# Patient Record
Sex: FEMALE | Race: WHITE | Employment: OTHER | URBAN - METROPOLITAN AREA
[De-identification: names, ages, dates, MRNs, and addresses within clinical notes are randomized per-mention and may not be internally consistent; named-entity substitution may affect disease eponyms.]

---

## 2020-02-11 ENCOUNTER — TRANSFERRED RECORDS (OUTPATIENT)
Dept: HEALTH INFORMATION MANAGEMENT | Facility: CLINIC | Age: 70
End: 2020-02-11

## 2020-02-18 ENCOUNTER — TRANSFERRED RECORDS (OUTPATIENT)
Dept: HEALTH INFORMATION MANAGEMENT | Facility: CLINIC | Age: 70
End: 2020-02-18

## 2020-03-03 ENCOUNTER — TRANSFERRED RECORDS (OUTPATIENT)
Dept: HEALTH INFORMATION MANAGEMENT | Facility: CLINIC | Age: 70
End: 2020-03-03

## 2020-03-03 ENCOUNTER — HOSPITAL ENCOUNTER (INPATIENT)
Facility: CLINIC | Age: 70
LOS: 3 days | Discharge: HOSPICE | DRG: 315 | End: 2020-03-06
Attending: EMERGENCY MEDICINE | Admitting: INTERNAL MEDICINE
Payer: COMMERCIAL

## 2020-03-03 DIAGNOSIS — C34.90 PRIMARY MALIGNANT NEOPLASM OF LUNG METASTATIC TO OTHER SITE, UNSPECIFIED LATERALITY (H): ICD-10-CM

## 2020-03-03 DIAGNOSIS — R55 NEAR SYNCOPE: ICD-10-CM

## 2020-03-03 DIAGNOSIS — B37.0 THRUSH: Primary | ICD-10-CM

## 2020-03-03 PROBLEM — N17.9 AKI (ACUTE KIDNEY INJURY) (H): Status: ACTIVE | Noted: 2020-03-03

## 2020-03-03 LAB
ALBUMIN SERPL-MCNC: 2.6 G/DL (ref 3.4–5)
ALP SERPL-CCNC: 49 U/L (ref 40–150)
ALT SERPL W P-5'-P-CCNC: 29 U/L (ref 0–50)
ANION GAP SERPL CALCULATED.3IONS-SCNC: 10 MMOL/L (ref 3–14)
ANISOCYTOSIS BLD QL SMEAR: SLIGHT
AST SERPL W P-5'-P-CCNC: 40 U/L (ref 0–45)
BASOPHILS # BLD AUTO: 0 10E9/L (ref 0–0.2)
BASOPHILS NFR BLD AUTO: 0 %
BILIRUB SERPL-MCNC: 1 MG/DL (ref 0.2–1.3)
BUN SERPL-MCNC: 60 MG/DL (ref 7–30)
CALCIUM SERPL-MCNC: 9.2 MG/DL (ref 8.5–10.1)
CHLORIDE SERPL-SCNC: 94 MMOL/L (ref 94–109)
CO2 SERPL-SCNC: 24 MMOL/L (ref 20–32)
CREAT SERPL-MCNC: 2.44 MG/DL (ref 0.52–1.04)
DIFFERENTIAL METHOD BLD: ABNORMAL
EOSINOPHIL # BLD AUTO: 0 10E9/L (ref 0–0.7)
EOSINOPHIL NFR BLD AUTO: 0 %
ERYTHROCYTE [DISTWIDTH] IN BLOOD BY AUTOMATED COUNT: 17.6 % (ref 10–15)
GFR SERPL CREATININE-BSD FRML MDRD: 20 ML/MIN/{1.73_M2}
GLUCOSE BLDC GLUCOMTR-MCNC: 136 MG/DL (ref 70–99)
GLUCOSE SERPL-MCNC: 135 MG/DL (ref 70–99)
HCT VFR BLD AUTO: 26.3 % (ref 35–47)
HGB BLD-MCNC: 8.8 G/DL (ref 11.7–15.7)
LACTATE BLD-SCNC: 1.4 MMOL/L (ref 0.7–2)
LYMPHOCYTES # BLD AUTO: 0.1 10E9/L (ref 0.8–5.3)
LYMPHOCYTES NFR BLD AUTO: 4 %
MACROCYTES BLD QL SMEAR: PRESENT
MCH RBC QN AUTO: 30.6 PG (ref 26.5–33)
MCHC RBC AUTO-ENTMCNC: 33.5 G/DL (ref 31.5–36.5)
MCV RBC AUTO: 91 FL (ref 78–100)
MONOCYTES # BLD AUTO: 0 10E9/L (ref 0–1.3)
MONOCYTES NFR BLD AUTO: 1 %
NEUTROPHILS # BLD AUTO: 2.9 10E9/L (ref 1.6–8.3)
NEUTROPHILS NFR BLD AUTO: 95 %
PLATELET # BLD AUTO: 176 10E9/L (ref 150–450)
PLATELET # BLD EST: ABNORMAL 10*3/UL
POTASSIUM SERPL-SCNC: 4.2 MMOL/L (ref 3.4–5.3)
PROT SERPL-MCNC: 6.2 G/DL (ref 6.8–8.8)
RBC # BLD AUTO: 2.88 10E12/L (ref 3.8–5.2)
SODIUM SERPL-SCNC: 128 MMOL/L (ref 133–144)
WBC # BLD AUTO: 3 10E9/L (ref 4–11)

## 2020-03-03 PROCEDURE — 99222 1ST HOSP IP/OBS MODERATE 55: CPT | Mod: AI | Performed by: INTERNAL MEDICINE

## 2020-03-03 PROCEDURE — 25000131 ZZH RX MED GY IP 250 OP 636 PS 637: Performed by: INTERNAL MEDICINE

## 2020-03-03 PROCEDURE — 83605 ASSAY OF LACTIC ACID: CPT | Performed by: EMERGENCY MEDICINE

## 2020-03-03 PROCEDURE — 25800030 ZZH RX IP 258 OP 636: Performed by: EMERGENCY MEDICINE

## 2020-03-03 PROCEDURE — 99291 CRITICAL CARE FIRST HOUR: CPT | Mod: 25

## 2020-03-03 PROCEDURE — 96361 HYDRATE IV INFUSION ADD-ON: CPT

## 2020-03-03 PROCEDURE — 25800030 ZZH RX IP 258 OP 636: Performed by: INTERNAL MEDICINE

## 2020-03-03 PROCEDURE — 12000011 ZZH R&B MS OVERFLOW

## 2020-03-03 PROCEDURE — 85025 COMPLETE CBC W/AUTO DIFF WBC: CPT | Performed by: EMERGENCY MEDICINE

## 2020-03-03 PROCEDURE — 25000132 ZZH RX MED GY IP 250 OP 250 PS 637: Performed by: INTERNAL MEDICINE

## 2020-03-03 PROCEDURE — 93005 ELECTROCARDIOGRAM TRACING: CPT

## 2020-03-03 PROCEDURE — 96360 HYDRATION IV INFUSION INIT: CPT

## 2020-03-03 PROCEDURE — 80053 COMPREHEN METABOLIC PANEL: CPT | Performed by: EMERGENCY MEDICINE

## 2020-03-03 PROCEDURE — 00000146 ZZHCL STATISTIC GLUCOSE BY METER IP

## 2020-03-03 RX ORDER — AMOXICILLIN 250 MG
2 CAPSULE ORAL 2 TIMES DAILY
Status: DISCONTINUED | OUTPATIENT
Start: 2020-03-03 | End: 2020-03-06 | Stop reason: HOSPADM

## 2020-03-03 RX ORDER — MORPHINE SULFATE 15 MG/1
15 TABLET, FILM COATED, EXTENDED RELEASE ORAL EVERY 12 HOURS
Status: DISCONTINUED | OUTPATIENT
Start: 2020-03-03 | End: 2020-03-04

## 2020-03-03 RX ORDER — HYDROMORPHONE HYDROCHLORIDE 1 MG/ML
.3-.5 INJECTION, SOLUTION INTRAMUSCULAR; INTRAVENOUS; SUBCUTANEOUS
Status: DISCONTINUED | OUTPATIENT
Start: 2020-03-03 | End: 2020-03-04

## 2020-03-03 RX ORDER — NALOXONE HYDROCHLORIDE 0.4 MG/ML
.1-.4 INJECTION, SOLUTION INTRAMUSCULAR; INTRAVENOUS; SUBCUTANEOUS
Status: DISCONTINUED | OUTPATIENT
Start: 2020-03-03 | End: 2020-03-06 | Stop reason: HOSPADM

## 2020-03-03 RX ORDER — DEXAMETHASONE 4 MG/1
4 TABLET ORAL 2 TIMES DAILY WITH MEALS
Status: DISCONTINUED | OUTPATIENT
Start: 2020-03-03 | End: 2020-03-06 | Stop reason: HOSPADM

## 2020-03-03 RX ORDER — GABAPENTIN 300 MG/1
300 CAPSULE ORAL 2 TIMES DAILY
Status: ON HOLD | COMMUNITY
End: 2020-03-05

## 2020-03-03 RX ORDER — CYCLOBENZAPRINE HCL 5 MG
5 TABLET ORAL 3 TIMES DAILY PRN
Status: ON HOLD | COMMUNITY
End: 2020-03-05

## 2020-03-03 RX ORDER — HYDROCODONE BITARTRATE AND ACETAMINOPHEN 5; 325 MG/1; MG/1
1-2 TABLET ORAL EVERY 4 HOURS PRN
Status: DISCONTINUED | OUTPATIENT
Start: 2020-03-03 | End: 2020-03-06 | Stop reason: HOSPADM

## 2020-03-03 RX ORDER — DOCUSATE SODIUM 100 MG/1
100 CAPSULE, LIQUID FILLED ORAL 2 TIMES DAILY
Status: DISCONTINUED | OUTPATIENT
Start: 2020-03-03 | End: 2020-03-03

## 2020-03-03 RX ORDER — ACETAMINOPHEN 325 MG/1
650 TABLET ORAL EVERY 4 HOURS PRN
Status: DISCONTINUED | OUTPATIENT
Start: 2020-03-03 | End: 2020-03-06 | Stop reason: HOSPADM

## 2020-03-03 RX ORDER — POLYETHYLENE GLYCOL 3350 17 G/17G
17 POWDER, FOR SOLUTION ORAL DAILY
Status: DISCONTINUED | OUTPATIENT
Start: 2020-03-04 | End: 2020-03-06 | Stop reason: HOSPADM

## 2020-03-03 RX ORDER — ALBUTEROL SULFATE 90 UG/1
2 AEROSOL, METERED RESPIRATORY (INHALATION) EVERY 6 HOURS PRN
Status: DISCONTINUED | OUTPATIENT
Start: 2020-03-03 | End: 2020-03-06 | Stop reason: HOSPADM

## 2020-03-03 RX ORDER — ONDANSETRON 4 MG/1
4 TABLET, ORALLY DISINTEGRATING ORAL EVERY 6 HOURS PRN
Status: DISCONTINUED | OUTPATIENT
Start: 2020-03-03 | End: 2020-03-06 | Stop reason: HOSPADM

## 2020-03-03 RX ORDER — OLANZAPINE 2.5 MG/1
2.5 TABLET, FILM COATED ORAL DAILY
Status: ON HOLD | COMMUNITY
End: 2020-03-05

## 2020-03-03 RX ORDER — SODIUM CHLORIDE 9 MG/ML
INJECTION, SOLUTION INTRAVENOUS CONTINUOUS
Status: DISCONTINUED | OUTPATIENT
Start: 2020-03-03 | End: 2020-03-04

## 2020-03-03 RX ORDER — LIDOCAINE 40 MG/G
CREAM TOPICAL
Status: DISCONTINUED | OUTPATIENT
Start: 2020-03-03 | End: 2020-03-06 | Stop reason: HOSPADM

## 2020-03-03 RX ORDER — GABAPENTIN 300 MG/1
300 CAPSULE ORAL 2 TIMES DAILY
Status: DISCONTINUED | OUTPATIENT
Start: 2020-03-03 | End: 2020-03-05

## 2020-03-03 RX ORDER — MORPHINE SULFATE 15 MG/1
15 TABLET, FILM COATED, EXTENDED RELEASE ORAL EVERY 12 HOURS
Status: ON HOLD | COMMUNITY
End: 2020-03-05

## 2020-03-03 RX ORDER — PROCHLORPERAZINE MALEATE 10 MG
10 TABLET ORAL EVERY 6 HOURS PRN
COMMUNITY

## 2020-03-03 RX ORDER — CYCLOBENZAPRINE HCL 5 MG
5 TABLET ORAL 3 TIMES DAILY PRN
Status: DISCONTINUED | OUTPATIENT
Start: 2020-03-03 | End: 2020-03-06 | Stop reason: HOSPADM

## 2020-03-03 RX ORDER — DEXAMETHASONE 4 MG/1
4 TABLET ORAL 2 TIMES DAILY WITH MEALS
COMMUNITY

## 2020-03-03 RX ORDER — ONDANSETRON 2 MG/ML
4 INJECTION INTRAMUSCULAR; INTRAVENOUS EVERY 6 HOURS PRN
Status: DISCONTINUED | OUTPATIENT
Start: 2020-03-03 | End: 2020-03-06 | Stop reason: HOSPADM

## 2020-03-03 RX ORDER — ALBUTEROL SULFATE 90 UG/1
2 AEROSOL, METERED RESPIRATORY (INHALATION) EVERY 6 HOURS PRN
COMMUNITY

## 2020-03-03 RX ORDER — BISACODYL 10 MG
10 SUPPOSITORY, RECTAL RECTAL DAILY PRN
Status: DISCONTINUED | OUTPATIENT
Start: 2020-03-03 | End: 2020-03-06 | Stop reason: HOSPADM

## 2020-03-03 RX ORDER — PROCHLORPERAZINE MALEATE 5 MG
10 TABLET ORAL EVERY 6 HOURS PRN
Status: DISCONTINUED | OUTPATIENT
Start: 2020-03-03 | End: 2020-03-06 | Stop reason: HOSPADM

## 2020-03-03 RX ORDER — HYDROCODONE BITARTRATE AND ACETAMINOPHEN 5; 325 MG/1; MG/1
1 TABLET ORAL EVERY 4 HOURS PRN
Status: ON HOLD | COMMUNITY
End: 2020-03-05

## 2020-03-03 RX ADMIN — FLUTICASONE FUROATE AND VILANTEROL TRIFENATATE 1 PUFF: 200; 25 POWDER RESPIRATORY (INHALATION) at 19:54

## 2020-03-03 RX ADMIN — SODIUM CHLORIDE 1000 ML: 9 INJECTION, SOLUTION INTRAVENOUS at 14:55

## 2020-03-03 RX ADMIN — SODIUM CHLORIDE 1000 ML: 9 INJECTION, SOLUTION INTRAVENOUS at 15:00

## 2020-03-03 RX ADMIN — DEXAMETHASONE 4 MG: 2 TABLET ORAL at 18:29

## 2020-03-03 RX ADMIN — SENNOSIDES AND DOCUSATE SODIUM 2 TABLET: 8.6; 5 TABLET ORAL at 19:53

## 2020-03-03 RX ADMIN — MORPHINE SULFATE 15 MG: 15 TABLET, EXTENDED RELEASE ORAL at 19:54

## 2020-03-03 RX ADMIN — SODIUM CHLORIDE: 9 INJECTION, SOLUTION INTRAVENOUS at 18:26

## 2020-03-03 RX ADMIN — HYDROCODONE BITARTRATE AND ACETAMINOPHEN 1 TABLET: 5; 325 TABLET ORAL at 18:30

## 2020-03-03 RX ADMIN — GABAPENTIN 300 MG: 300 CAPSULE ORAL at 19:53

## 2020-03-03 ASSESSMENT — ACTIVITIES OF DAILY LIVING (ADL)
SWALLOWING: 0-->SWALLOWS FOODS/LIQUIDS WITHOUT DIFFICULTY
AMBULATION: 2-->ASSISTIVE PERSON
RETIRED_COMMUNICATION: 0-->UNDERSTANDS/COMMUNICATES WITHOUT DIFFICULTY
FALL_HISTORY_WITHIN_LAST_SIX_MONTHS: YES
TOILETING: 1-->ASSISTIVE EQUIPMENT
TRANSFERRING: 2-->ASSISTIVE PERSON
COGNITION: 1 - ATTENTION OR MEMORY DEFICITS
NUMBER_OF_TIMES_PATIENT_HAS_FALLEN_WITHIN_LAST_SIX_MONTHS: 1
RETIRED_EATING: 0-->INDEPENDENT
WHICH_OF_THE_ABOVE_FUNCTIONAL_RISKS_HAD_A_RECENT_ONSET_OR_CHANGE?: AMBULATION;TRANSFERRING;TOILETING;BATHING;DRESSING;FALL HISTORY
BATHING: 2-->ASSISTIVE PERSON
DRESS: 1-->ASSISTIVE EQUIPMENT

## 2020-03-03 ASSESSMENT — ENCOUNTER SYMPTOMS
HEADACHES: 0
WEAKNESS: 1
VOMITING: 0
DIARRHEA: 0
APPETITE CHANGE: 1
CONSTIPATION: 1
COUGH: 0
FEVER: 0

## 2020-03-03 NOTE — ED PROVIDER NOTES
History     Chief Complaint:  Loss of Consciousness    The history is provided by the patient, a relative, the EMS personnel and the spouse. The history is limited by the condition of the patient.      Susan Beckham is a 69 year old female who presents with thyroid and breast cancer who presents today via EMS for evaluation after a pre-syncopal episode. Per family, patient has been declining from a functional status over the past few days.  She has not as alert.  She is generally weak.  She has not eating as much.  No specific fever or cough or vomiting or diarrhea.  She is quite constipated due to the opiates she is taking.  Per family, her oncologist is Dr. Rosenthal at [facility] and they have been told that she does not have many days left to live.  Patient's daughter is power of  who is here at bedside.  Pt daughter and fiance and patient herself describe deciding that patient is DNR, DNI recently but have not signed the paperwork.     This morning, patient's knees buckled and she nearly fell though her  was there to catch her.  They then called an ambulance to get her to her appointment this morning.      Prior to arrival, the patient was walking in to her oncology toxicity check, with her last chemotherapy last week, when she had a possible syncopal episode. EMS reports that she was unconscious for 1-2 minutes but pt's family states that she never lost conscioussness. Pt did not hit her head. While sitting in a chair, her blood pressure was found to be 60/30 and had a heart rate of 120. She slowly started to come around and is mentating at her baseline. En route to the ED, EMS found her blood pressure to be 111/81 systolic with 94-95% O2 saturations, no blood sugar was taken. Here, patient is asymptomatic.     Allergies:  Sulfa drugs    Medications:    Lorazepam  Omeprazole  Norco  Levothyroxine    Past Medical History:    HTN  Malignant neoplasm  Thyroid disease  Cancer, neuroendocrine,  poorly differentiated  Colitis  Depression   GERD  Asthma    Past Surgical History:    Gyn surgery  Mastectomy  Thyroidectomy  Head and neck surgery  DAVID BSO    Family History:    Cancer, daughter, mother, father, sister    Social History:  Smoking status: Never smoker  Alcohol use: Socially not currently  Drug use: No  PCP: Ayde Wong MD  Marital Status:       Review of Systems   Constitutional: Positive for appetite change. Negative for fever.   Respiratory: Negative for cough.    Gastrointestinal: Positive for constipation. Negative for diarrhea and vomiting.   Neurological: Positive for syncope and weakness. Negative for headaches.   All other systems reviewed and are negative.    Physical Exam     Patient Vitals for the past 24 hrs:   BP Temp Temp src Pulse Heart Rate Resp SpO2   03/03/20 1645 -- -- -- -- 113 22 --   03/03/20 1630 -- -- -- -- 114 17 --   03/03/20 1545 -- -- -- -- 108 15 --   03/03/20 1515 -- -- -- -- -- 18 98 %   03/03/20 1500 (!) 115/99 -- -- 106 -- -- --   03/03/20 1445 91/49 -- -- -- 113 -- --   03/03/20 1443 -- 98.4  F (36.9  C) Oral -- -- -- --   03/03/20 1442 (!) 89/66 -- -- -- 115 18 98 %        Physical Exam  VS: Reviewed per above  HENT: Mucous membranes dry, no nuchal rigidity  EYES: sclera anicteric  CV: Rate as noted, regular rhythm.   RESP: Effort normal. Breath sounds are normal bilaterally.  GI: no tenderness/rebound/guarding, + distended.  NEURO: GCS 14, oriented to person and place, no facial asymmetry, no drift in extremities.  MSK: No deformity of the extremities  SKIN: Warm and dry      Emergency Department Course     ECG:  ECG taken at 1440, ECG read at 1445  Sinus tachycardia  Left anterior fascicular block   Possible anterior infarct, age undetermined  Abnormal ECG  Rate 114 bpm. MI interval 158 ms. QRS duration 94 ms. QT/QTc 334/460 ms. P-R-T axes 12 -50 54.    Laboratory:  Laboratory findings were communicated with the patient who voiced understanding of the  findings.    CBC: WBC 3.0(L), HGB 8.8(L),   CMP: (L), Glucose 135(H), BUN 60 (H), GFR 20(L), Creatinine 2.44(H)  Lactic acid whole blood: 1.4  Glucose by meter: 136(H)    Interventions:  1455 NS 1L IV Bolus   1500 NS 1L IV Bolus     Emergency Department Course:  1430 Nursing notes and vitals reviewed.    I performed an exam of the patient as documented above.     1454 IV was inserted and blood was drawn for laboratory testing, results above.     I personally reviewed the results with the patient and answered all related questions prior to admission.    5060 I spoke to Dr. Bui of the hospitalist service who accepts the patient for admission.    Impression & Plan      Medical Decision Making:  Susan Beckham is a 69 year old female who presents to the emergency department today for evaluation of near syncopal episode, hypotension.  On arrival patient's blood pressure was 90 systolic, heart rate is in the low 100s.  EKG reveals a sinus tachycardia.  Initial labs reveal hyponatremia of 128, creatinine elevation of 2.4 compared to 0.9 on 12/19 in care everywhere.  After IV fluids, blood pressure improved.  I discussed at length with patient and daughter who is her POA as well as her fiancé.  They confirm DNR/DNI status.  I then discussed next steps including further evaluation of her low blood pressures/near syncopal events as well as potential etiologies such as dehydration, sepsis, blood loss anemia, PE, cardiogenic shock, worsening metastatic disease.  Patient and family discussed amongst themselves and decided that they would like to focus on comfort measures at this time and not life-prolonging interventions or testing.  Patient was provided with bear hugger she was cold and water as she was thirsty.  She declined any pain medications.  Patient was admitted to Dr. Bui for further palliative care evaluation.    Diagnosis:    ICD-10-CM    1. Primary malignant neoplasm of lung metastatic to other site,  unspecified laterality (H) C34.90 CBC with platelets differential   2. Near syncope R55        Disposition:   The patient is admitted into the care of Dr. Bui.     Scribe Disclosure:  I, Cassandra Katz, am serving as a scribe on 3/3/2020 to document services personally performed by Winston Domingo MD based on my observations and the provider's statements to me.  [facility] EMERGENCY DEPARTMENT       Winston Domingo MD  03/03/20 1057

## 2020-03-03 NOTE — PHARMACY-ADMISSION MEDICATION HISTORY
Admission medication history interview status for this patient is complete. See [facility] admission navigator for allergy information, prior to admission medications and immunization status.     Medication history interview source(s):Family  Medication history resources (including written lists, pill bottles, clinic record):pill bottles    Changes made to PTA medication list:  Added: all  Deleted: none  Changed: none    Actions taken by pharmacist (provider contacted, etc):None     Additional medication history information:None    Medication reconciliation/reorder completed by provider prior to medication history? No    For patients on insulin therapy: no (Yes/No)   Lantus/levemir/NPH/Mix 70/30 dose: ___ in AM/PM or twice daily   Sliding scale Novolog Y/N   If Yes, do you have a baseline novolog pre-meal dose: ______units with meals   Patients eat three meals a day: Y/N ---  How many episodes of hypoglycemia (low blood glucose) do you have weekly: ---   How many missed doses do you have a week: ---  How many times do you check your blood glucose per day: ---  Any Barriers to therapy: cost of medications/comfortable with giving injections (if applicable)/ comfortable and confident with current diabetes regimen ---      Prior to Admission medications    Medication Sig Last Dose Taking? Auth Provider   albuterol (PROAIR HFA/PROVENTIL HFA/VENTOLIN HFA) 108 (90 Base) MCG/ACT inhaler Inhale 2 puffs into the lungs every 6 hours as needed for shortness of breath / dyspnea or wheezing  Yes Unknown, Entered By History   cyclobenzaprine (FLEXERIL) 5 MG tablet Take 5 mg by mouth 3 times daily as needed for muscle spasms  Yes Unknown, Entered By History   dexamethasone (DECADRON) 4 MG tablet Take 4 mg by mouth 2 times daily (with meals) 3/3/2020 at am Yes Unknown, Entered By History   gabapentin (NEURONTIN) 300 MG capsule Take 300 mg by mouth 2 times daily 3/3/2020 at am Yes Unknown, Entered By History   HYDROcodone-acetaminophen  (NORCO) 5-325 MG tablet Take 1 tablet by mouth every 4 hours as needed for severe pain  Yes Unknown, Entered By History   mometasone-formoterol (DULERA) 200-5 MCG/ACT inhaler Inhale 2 puffs into the lungs 2 times daily 3/3/2020 at am Yes Unknown, Entered By History   morphine (MS CONTIN) 15 MG CR tablet Take 15 mg by mouth every 12 hours 3/3/2020 at 0830 Yes Unknown, Entered By History   OLANZapine (ZYPREXA) 2.5 MG tablet Take 2.5 mg by mouth daily Or as directed on chemo days  Yes Unknown, Entered By History   prochlorperazine (COMPAZINE) 10 MG tablet Take 10 mg by mouth every 6 hours as needed for nausea or vomiting  Yes Unknown, Entered By History

## 2020-03-03 NOTE — H&P
[facility]  Hospitalist Admission Note  March 3, 2020  Name: Susan Beckham    MRN: [id number]  YOB: 1950    Age: 69 year old  Date of admission: 3/3/2020  Primary care provider: Ayde Wong      Summary:  Patient is a pleasant 69-year-old female with a history of thyroid status post thyroidectomy and breast cancer status post bilateral mastectomy back in 1991 with previous bilateral mastectomies hypertension, moderate persistent asthma, depression, and metastatic lung cancer currently followed by Dr. Shah in [place] as well as Dr. Rosenthal here with [place] [facility] ([facility]) who presents here with a near syncopal event.  Patient was scheduled for an outpatient appointment at her oncologist's office today. Pt had gotten progressively weaker over the past couple months while undergoing chemotherapy. Last treatment was 1 week ago.  She also has not eaten very well in the past couple weeks and especially in the past couple days.  She had gotten so weak that the paramedics actually had to bring her in for her outpatient appointment.  While she was there and ambulating in the clinic before even being seen by Dr. Rosenthal, she felt a bit lightheaded and describes that her knees buckled.  It was fortunate that her fiance was close by and was able to catch her before she fell to the ground.  EMS was called.  On their arrival, it was felt that she may have had a near syncopal event.  Her last chemotherapy session was 1 week ago.  While sitting in the chair, blood pressure on scene was 60/30 and she had heart rate in the 120s.  She slowly started to come around and was mentating at baseline.  There is no evidence of any seizure-like activity.  Blood pressure did improve on route to the ED.    On arrival to the ED, patient was given 2 more liters of IV fluids.  Work-up included a basic panel that demonstrated a low sodium of 128 with a BUN of 60 and creatinine of 2.44.  She was  mildly leukopenic with a white blood count of 3 and anemic with hemoglobin 8.8.  There are no signs or symptoms of acute blood loss.  In discussing the case between the ED physician and the family, patient and family now has decided to be DNR/DNI and are looking into options for hospice.  This decision was discussed between the patient and the patient's primary oncologist as they were told that she did not have much days left and probably should focus more on quality of life given the advance metastatic disease that she has.  I was asked to admit her for appropriate disposition, hospice consultation, and palliative care team/ consultation.  Currently, patient feels much better but just a bit chilled.     Problem list/Plan:    Near syncopal event: Likely orthostatic in nature secondary to hypovolemia from poor p.o. intake.  No signs of acute blood loss anemia.  Also no arrhythmia that would explain this.  The ED physician did discuss the option of further aggressive work-up such as a CT PE protocol to rule out a PE as the cause of her hypotension and near syncopal event but patient and family had deferred as they only wanted palliative efforts.  -We will offer IV fluids overnight will for symptom control and to prevent recurrent syncopal event.  This may also improve her strength.    Acute kidney injury: Likely prerenal in nature given poor p.o. intake with elevated BUN to creatinine ratio.  -IV fluids overnight  -We will check a BMP tomorrow but will not pursue further aggressive work-up    Metastatic lung cancer:  -No further treatment per patient and patient's family wishes.  -We will consult  and palliative care team to arrange for appropriate hospice enrollment  -Per daughter, patient had pretty much has gotten to the point where she will unlikely be safe enough to be at home for home hospice nor do they have the resources to keep her there safely.   and palliative care  team can talk about residential hospice options.  -Defer on consulting oncology formally but may be worthwhile letting them know what the patient and the patient's family has decided  -Pain control.  Patient chronically on MS Contin 15 mg p.o. twice daily with PRN [place].  This will be ordered and have IV Dilaudid as needed also available.  -Continue Decadron for now which may help with appetite and energy    History of moderate persistent asthma: Stable without evidence of acute exacerbation  -May resume her chronic inhalers    -Additional workup plan which will include palliative care consultation and     All lab work and imaging data independently reviewed by myself    Prophylaxis;  PCD/Ambulation.     Code status: DNR/DNI    Discharge: TBD but likely residential hospice at discharge    Chief Complaint:   Near syncope   HPI  Patient is a pleasant 69-year-old female with a history of thyroid status post thyroidectomy and breast cancer status post bilateral mastectomy back in 1991 with previous bilateral mastectomies hypertension, moderate persistent asthma, depression, and metastatic lung cancer currently followed by Dr. Shah in [place] as well as Dr. Rosenthal here with [place] [facility] ([facility]) who presents here with a near syncopal event.  Patient was scheduled for an outpatient appointment at her oncologist's office today. Pt had gotten progressively weaker over the past couple months while undergoing chemotherapy. Last treatment was 1 week ago.  She also has not eaten very well in the past couple weeks and especially in the past couple days.  She had gotten so weak that the paramedics actually had to bring her in for her outpatient appointment.  While she was there and ambulating in the clinic before even being seen by Dr. Rosenthal, she felt a bit lightheaded and describes that her knees buckled.  It was fortunate that gustavo was close by and was able to catch her before she fell to the ground.  EMS  was called.  On their arrival, it was felt that she may have had a near syncopal event.  Her last chemotherapy session was 1 week ago.  While sitting in the chair, blood pressure on scene was 60/30 and she had heart rate in the 120s.  She slowly started to come around and was mentating at baseline.  There is no evidence of any seizure-like activity.  Blood pressure did improve on route to the ED.    On arrival to the ED, patient was given 2 more liters of IV fluids.  Work-up included a basic panel that demonstrated a low sodium of 128 with a BUN of 60 and creatinine of 2.44.  She was mildly leukopenic with a white blood count of 3 and anemic with hemoglobin 8.8.  There are no signs or symptoms of acute blood loss.  In discussing the case between the ED physician and the family, patient and family now has decided to be DNR/DNI and are looking into options for hospice.  This decision was discussed between the patient and the patient's primary oncologist as they were told that she did not have much days left and probably should focus more on quality of life given the advance metastatic disease that she has.  I was asked to admit her for appropriate disposition, hospice consultation, and palliative care team/ consultation.  Currently, patient feels much better but just a bit chilled.    I did discuss the case in detail with the ED physician.     Past Medical History:     Past Medical History:   Diagnosis Date     Hypertension      Malignant neoplasm (H)     breast cancer, double mastectomy 1991     Thyroid disease     thyroidectomy     Past Surgical History:     Past Surgical History:   Procedure Laterality Date     BREAST SURGERY       GYN SURGERY       HEAD & NECK SURGERY       HYSTERECTOMY      complete, 1998     MASTECTOMY      double, 1991     THYROIDECTOMY       Social History:     Social History     Tobacco Use     Smoking status: Never Smoker   Substance Use Topics     Alcohol use: Not on file      Comment: occasional, sociallly     Drug use: No     Family History:  Family history reviewed. NO pertinent family history     Allergies:     Allergies   Allergen Reactions     Sulfa Drugs Hives     Medications:     Medications Prior to Admission   Medication Sig Dispense Refill Last Dose     albuterol (PROAIR HFA/PROVENTIL HFA/VENTOLIN HFA) 108 (90 Base) MCG/ACT inhaler Inhale 2 puffs into the lungs every 6 hours as needed for shortness of breath / dyspnea or wheezing        cyclobenzaprine (FLEXERIL) 5 MG tablet Take 5 mg by mouth 3 times daily as needed for muscle spasms        dexamethasone (DECADRON) 4 MG tablet Take 4 mg by mouth 2 times daily (with meals)   3/3/2020 at am     gabapentin (NEURONTIN) 300 MG capsule Take 300 mg by mouth 2 times daily   3/3/2020 at am     HYDROcodone-acetaminophen (NORCO) 5-325 MG tablet Take 1 tablet by mouth every 4 hours as needed for severe pain        mometasone-formoterol (DULERA) 200-5 MCG/ACT inhaler Inhale 2 puffs into the lungs 2 times daily   3/3/2020 at am     morphine (MS CONTIN) 15 MG CR tablet Take 15 mg by mouth every 12 hours   3/3/2020 at 0830     OLANZapine (ZYPREXA) 2.5 MG tablet Take 2.5 mg by mouth daily Or as directed on chemo days        prochlorperazine (COMPAZINE) 10 MG tablet Take 10 mg by mouth every 6 hours as needed for nausea or vomiting          Review of Systems:   A Comprehensive greater than 10 system review of systems was carried out.  Pertinent positives and negatives are noted above.  Otherwise negative for contributory information.        Physical Exam:  Blood pressure 103/56, pulse 94, temperature 98.6  F (37  C), temperature source Oral, resp. rate 20, SpO2 97 %.  Gen: Pleasant, alert but appears chronically ill.  Otherwise, in no acute distress.  HEENT: NCAT. EOMI. PERRL.  Neck: Normal inspection. No bruit, JVD or thyromegaly.  Lungs: Normal respiratory effort. Clear to auscultation bilaterally with no crackles or wheezes.  Card: N s1s2.  RRR. No M/R/G.  Peripheral pulses present and symmetric.   Abd: Soft NT ND. No mass. Normal bowel sounds.  Skin: No rash. Warm to the touch  Extr: No edema. CMS intact  Psychiatric: Patient alert oriented ×3.  Normal affect  Neurologic: Cranial nerves II-XII are intact.   Motor strength 4/5 but symmetric    Data:     Recent Labs   Lab 03/03/20  1455   WBC 3.0*   HGB 8.8*   HCT 26.3*   MCV 91        Recent Labs   Lab 03/03/20  1455   *   POTASSIUM 4.2   CHLORIDE 94   CO2 24   ANIONGAP 10   *   BUN 60*   CR 2.44*   GFRESTIMATED 20*   GFRESTBLACK 23*   DAYAMI 9.2   PROTTOTAL 6.2*   ALBUMIN 2.6*   BILITOTAL 1.0   ALKPHOS 49   AST 40   ALT 29     No results for input(s): INR in the last 168 hours.    Imaging:   No results found for this or any previous visit (from the past 24 hour(s)).    Stephanie Bui MD Pager [phone]

## 2020-03-03 NOTE — ED NOTES
Bed: ED32  Expected date: 3/3/20  Expected time: 2:22 PM  Means of arrival: Ambulance  Comments:  BV3  68yo Syncope

## 2020-03-03 NOTE — ED TRIAGE NOTES
Pt arrives via EMS from [facility] after syncopal episode. Pt did not hit head, last chemo was 1 week ago, being treated for lung CA with mets. Pt tachycardic and hypotensive during triage. Alert and oriented x3.

## 2020-03-03 NOTE — ED NOTES
RECEIVING UNIT ED HANDOFF REVIEW    Above ED Nurse Handoff Report was reviewed: Yes  Reviewed by: Kelsi Rollins RN on March 3, 2020 at 4:27 PM           [facility]  ED Nurse Handoff Report    Susan Beckham is a 69 year old female   ED Chief complaint: Loss of Consciousness  . ED Diagnosis:   Final diagnoses:   Primary malignant neoplasm of lung metastatic to other site, unspecified laterality (H)   Near syncope     Allergies:   Allergies   Allergen Reactions     Sulfa Drugs Hives       Code Status: DNR / DNI, no code status on file, family has made it clear they would like DNR status in ED today.  Activity level - Baseline/Home:  Stand by Assist. Activity Level - Current:   Assist X 2. Lift room needed: No. Bariatric: No   Needed: No   Isolation: Yes. Infection: Cancer/ chemo therapy.     Vital Signs:   Vitals:    03/03/20 1445 03/03/20 1500 03/03/20 1515 03/03/20 1545   BP: 91/49 (!) 115/99     Pulse:  106     Resp:   18 15   Temp:       TempSrc:       SpO2:   98%        Cardiac Rhythm:  ,   Cardiac  Cardiac Rhythm: Sinus tachycardia  Pain level:    Patient confused: Yes, intermittently. Patient Falls Risk: Yes.   Elimination Status: no urge since ED admission   Patient Report - Initial Complaint: Pt arrives via EMS from [facility] after syncopal episode. Pt did not hit head, last chemo was 1 week ago, being treated for lung CA with mets. Pt tachycardic and hypotensive during triage. Alert and oriented x3. Focused Assessment:   15:25 Cardiac RP       Details:   Cardiac Monitoring - EKG Monitoring: Yes  Cardiac Regularity: Regular  Cardiac Rhythm: ST        15:25 Neurological RP      Details:   Cognitive - Cognitive/Neuro/Behavioral WDL: -WDL except (syncopal episode at clinic. Pt initally Ox3, now Ox4. ) Level Of Consciousness: alert  Orientation: oriented x 4  Follows Commands: yes  Best Language: 0 - No aphasia          Tests Performed: Labs. Abnormal Results:   Labs Ordered and  Resulted from Time of ED Arrival Up to the Time of Departure from the ED   COMPREHENSIVE METABOLIC PANEL - Abnormal; Notable for the following components:       Result Value    Sodium 128 (*)     Glucose 135 (*)     Urea Nitrogen 60 (*)     Creatinine 2.44 (*)     GFR Estimate 20 (*)     GFR Estimate If Black 23 (*)     Albumin 2.6 (*)     Protein Total 6.2 (*)     All other components within normal limits   CBC WITH PLATELETS DIFFERENTIAL - Abnormal; Notable for the following components:    WBC 3.0 (*)     RBC Count 2.88 (*)     Hemoglobin 8.8 (*)     Hematocrit 26.3 (*)     RDW 17.6 (*)     Absolute Lymphocytes 0.1 (*)     All other components within normal limits   GLUCOSE BY METER - Abnormal; Notable for the following components:    Glucose 136 (*)     All other components within normal limits   LACTIC ACID WHOLE BLOOD      Treatments provided: 2 L IVF  Family Comments: Daughter at bedside, pt and family endorsing comfort cares  OBS brochure/video discussed/provided to patient:  Yes  ED Medications:   Medications   0.9% sodium chloride BOLUS (0 mLs Intravenous Stopped 3/3/20 1550)   0.9% sodium chloride BOLUS (0 mLs Intravenous Stopped 3/3/20 1551)     Drips infusing:  No  For the majority of the shift, the patient's behavior Green. Interventions performed were N/A.    Sepsis treatment initiated: No       ED Nurse Name/Phone Number: Julio Karimi RN,   3:59 PM

## 2020-03-04 LAB
ANION GAP SERPL CALCULATED.3IONS-SCNC: 6 MMOL/L (ref 3–14)
BUN SERPL-MCNC: 35 MG/DL (ref 7–30)
CALCIUM SERPL-MCNC: 8.5 MG/DL (ref 8.5–10.1)
CHLORIDE SERPL-SCNC: 103 MMOL/L (ref 94–109)
CO2 SERPL-SCNC: 23 MMOL/L (ref 20–32)
CREAT SERPL-MCNC: 0.71 MG/DL (ref 0.52–1.04)
ERYTHROCYTE [DISTWIDTH] IN BLOOD BY AUTOMATED COUNT: 17.5 % (ref 10–15)
GFR SERPL CREATININE-BSD FRML MDRD: 87 ML/MIN/{1.73_M2}
GLUCOSE SERPL-MCNC: 147 MG/DL (ref 70–99)
HCT VFR BLD AUTO: 24.3 % (ref 35–47)
HGB BLD-MCNC: 8.1 G/DL (ref 11.7–15.7)
INTERPRETATION ECG - MUSE: NORMAL
LACTATE BLD-SCNC: 1.3 MMOL/L (ref 0.7–2)
MCH RBC QN AUTO: 30.5 PG (ref 26.5–33)
MCHC RBC AUTO-ENTMCNC: 33.3 G/DL (ref 31.5–36.5)
MCV RBC AUTO: 91 FL (ref 78–100)
PLATELET # BLD AUTO: 162 10E9/L (ref 150–450)
POTASSIUM SERPL-SCNC: 3.4 MMOL/L (ref 3.4–5.3)
RBC # BLD AUTO: 2.66 10E12/L (ref 3.8–5.2)
SODIUM SERPL-SCNC: 132 MMOL/L (ref 133–144)
WBC # BLD AUTO: 1.5 10E9/L (ref 4–11)

## 2020-03-04 PROCEDURE — 40000275 ZZH STATISTIC RCP TIME EA 10 MIN

## 2020-03-04 PROCEDURE — 85027 COMPLETE CBC AUTOMATED: CPT | Performed by: INTERNAL MEDICINE

## 2020-03-04 PROCEDURE — 99223 1ST HOSP IP/OBS HIGH 75: CPT | Performed by: CLINICAL NURSE SPECIALIST

## 2020-03-04 PROCEDURE — 94640 AIRWAY INHALATION TREATMENT: CPT

## 2020-03-04 PROCEDURE — 12000000 ZZH R&B MED SURG/OB

## 2020-03-04 PROCEDURE — G0463 HOSPITAL OUTPT CLINIC VISIT: HCPCS

## 2020-03-04 PROCEDURE — 25000131 ZZH RX MED GY IP 250 OP 636 PS 637: Performed by: INTERNAL MEDICINE

## 2020-03-04 PROCEDURE — 25000132 ZZH RX MED GY IP 250 OP 250 PS 637: Performed by: CLINICAL NURSE SPECIALIST

## 2020-03-04 PROCEDURE — 25000128 H RX IP 250 OP 636: Performed by: HOSPITALIST

## 2020-03-04 PROCEDURE — 25000132 ZZH RX MED GY IP 250 OP 250 PS 637: Performed by: INTERNAL MEDICINE

## 2020-03-04 PROCEDURE — 80048 BASIC METABOLIC PNL TOTAL CA: CPT | Performed by: INTERNAL MEDICINE

## 2020-03-04 PROCEDURE — 25800030 ZZH RX IP 258 OP 636: Performed by: INTERNAL MEDICINE

## 2020-03-04 PROCEDURE — 83605 ASSAY OF LACTIC ACID: CPT | Performed by: INTERNAL MEDICINE

## 2020-03-04 PROCEDURE — 25800030 ZZH RX IP 258 OP 636: Performed by: HOSPITALIST

## 2020-03-04 PROCEDURE — 36415 COLL VENOUS BLD VENIPUNCTURE: CPT | Performed by: INTERNAL MEDICINE

## 2020-03-04 PROCEDURE — 25000128 H RX IP 250 OP 636: Performed by: INTERNAL MEDICINE

## 2020-03-04 RX ORDER — MORPHINE SULFATE 15 MG/1
15 TABLET, FILM COATED, EXTENDED RELEASE ORAL EVERY 8 HOURS
Status: COMPLETED | OUTPATIENT
Start: 2020-03-04 | End: 2020-03-04

## 2020-03-04 RX ORDER — FENTANYL 12.5 UG/1
12 PATCH TRANSDERMAL
Status: DISCONTINUED | OUTPATIENT
Start: 2020-03-04 | End: 2020-03-05

## 2020-03-04 RX ORDER — SODIUM CHLORIDE, SODIUM LACTATE, POTASSIUM CHLORIDE, CALCIUM CHLORIDE 600; 310; 30; 20 MG/100ML; MG/100ML; MG/100ML; MG/100ML
INJECTION, SOLUTION INTRAVENOUS CONTINUOUS
Status: DISCONTINUED | OUTPATIENT
Start: 2020-03-04 | End: 2020-03-04

## 2020-03-04 RX ORDER — HYDROMORPHONE HYDROCHLORIDE 1 MG/ML
0.5 INJECTION, SOLUTION INTRAMUSCULAR; INTRAVENOUS; SUBCUTANEOUS
Status: DISCONTINUED | OUTPATIENT
Start: 2020-03-04 | End: 2020-03-05

## 2020-03-04 RX ADMIN — SODIUM CHLORIDE, POTASSIUM CHLORIDE, SODIUM LACTATE AND CALCIUM CHLORIDE: 600; 310; 30; 20 INJECTION, SOLUTION INTRAVENOUS at 11:28

## 2020-03-04 RX ADMIN — DEXAMETHASONE 4 MG: 2 TABLET ORAL at 17:48

## 2020-03-04 RX ADMIN — HYDROCODONE BITARTRATE AND ACETAMINOPHEN 2 TABLET: 5; 325 TABLET ORAL at 17:48

## 2020-03-04 RX ADMIN — HYDROMORPHONE HYDROCHLORIDE 0.5 MG: 1 INJECTION, SOLUTION INTRAMUSCULAR; INTRAVENOUS; SUBCUTANEOUS at 00:58

## 2020-03-04 RX ADMIN — HYDROCODONE BITARTRATE AND ACETAMINOPHEN 2 TABLET: 5; 325 TABLET ORAL at 11:30

## 2020-03-04 RX ADMIN — Medication 1 MG: at 22:29

## 2020-03-04 RX ADMIN — MORPHINE SULFATE 15 MG: 15 TABLET, EXTENDED RELEASE ORAL at 14:45

## 2020-03-04 RX ADMIN — MORPHINE SULFATE 15 MG: 15 TABLET, EXTENDED RELEASE ORAL at 07:24

## 2020-03-04 RX ADMIN — POLYETHYLENE GLYCOL 3350 17 G: 17 POWDER, FOR SOLUTION ORAL at 09:13

## 2020-03-04 RX ADMIN — FENTANYL 1 PATCH: 12.5 PATCH TRANSDERMAL at 14:44

## 2020-03-04 RX ADMIN — HYDROMORPHONE HYDROCHLORIDE 0.5 MG: 1 INJECTION, SOLUTION INTRAMUSCULAR; INTRAVENOUS; SUBCUTANEOUS at 10:09

## 2020-03-04 RX ADMIN — SODIUM CHLORIDE: 9 INJECTION, SOLUTION INTRAVENOUS at 04:21

## 2020-03-04 RX ADMIN — HYDROMORPHONE HYDROCHLORIDE 0.5 MG: 1 INJECTION, SOLUTION INTRAMUSCULAR; INTRAVENOUS; SUBCUTANEOUS at 14:44

## 2020-03-04 RX ADMIN — FLUTICASONE FUROATE AND VILANTEROL TRIFENATATE 1 PUFF: 200; 25 POWDER RESPIRATORY (INHALATION) at 08:31

## 2020-03-04 RX ADMIN — HYDROMORPHONE HYDROCHLORIDE 0.5 MG: 1 INJECTION, SOLUTION INTRAMUSCULAR; INTRAVENOUS; SUBCUTANEOUS at 12:32

## 2020-03-04 RX ADMIN — HYDROMORPHONE HYDROCHLORIDE 0.5 MG: 1 INJECTION, SOLUTION INTRAMUSCULAR; INTRAVENOUS; SUBCUTANEOUS at 20:52

## 2020-03-04 RX ADMIN — MORPHINE SULFATE 15 MG: 15 TABLET, EXTENDED RELEASE ORAL at 22:26

## 2020-03-04 RX ADMIN — SENNOSIDES AND DOCUSATE SODIUM 2 TABLET: 8.6; 5 TABLET ORAL at 09:13

## 2020-03-04 RX ADMIN — HYDROMORPHONE HYDROCHLORIDE 0.5 MG: 1 INJECTION, SOLUTION INTRAMUSCULAR; INTRAVENOUS; SUBCUTANEOUS at 07:24

## 2020-03-04 RX ADMIN — DEXAMETHASONE 4 MG: 2 TABLET ORAL at 09:13

## 2020-03-04 RX ADMIN — SENNOSIDES AND DOCUSATE SODIUM 2 TABLET: 8.6; 5 TABLET ORAL at 22:00

## 2020-03-04 RX ADMIN — GABAPENTIN 300 MG: 300 CAPSULE ORAL at 20:52

## 2020-03-04 RX ADMIN — GABAPENTIN 300 MG: 300 CAPSULE ORAL at 07:24

## 2020-03-04 ASSESSMENT — ACTIVITIES OF DAILY LIVING (ADL): ADLS_ACUITY_SCORE: 22

## 2020-03-04 NOTE — CONSULTS
"CLINICAL NUTRITION SERVICES  -  ASSESSMENT NOTE      Malnutrition Diagnosis: Unable to determine due to deferred nutrition/wt history (per palliative team) and no admit wt        REASON FOR ASSESSMENT  Susan Beckham is a 69 year old female seen by Registered Dietitian for Admission Nutrition Risk Screen for unintentional loss of 10# or more in the past two months and stageable pressure injuries or large/non-healing wound or burn.    PMH of: Thyroidectomy, breast CA s/p BL mastectomy (1991), asthma, HTN, metastatic lung CA (not currently receiving treatment \"per patient and patient's family wishes\", refer to MD note).    Admit 2/2: Syncope, found to have TRACY.    NUTRITION HISTORY  - Information obtained from chart as ongoing goals of care discussions, deferred nutrition assessment per discussion with palliative team.  - Allergies: NKFA.      CURRENT NUTRITION ORDERS  Diet Order:     Regular    Current Intake/Tolerance:  No documentation of PO intakes per flowsheets since admit.        NUTRITION FOCUSED PHYSICAL ASSESSMENT FOR DIAGNOSING MALNUTRITION)  No:  Deferred, see above         Observed:    Deferred, see above    Obtained from Chart/Interdisciplinary Team:  Stooling patterns reviewed    WOCN following with documented DTPI vs unstageable PI from coccyx to anus within gluteal cleft, \"suspected end of life skin necrosis complicated by incontinence\" (refer to note)    ANTHROPOMETRICS  Height: Data Unavailable  Weight: 0 lbs 0 oz  There is no height or weight on file to calculate BMI.  Weight Status: Unable to determine  Weight History:  Wt Readings from Last 10 Encounters:   No data found for Wt   - Wt of 169# from 1/08/2020 per care everywhere review.    LABS  Labs reviewed:  Electrolytes  Potassium (mmol/L)   Date Value   03/04/2020 3.4   03/03/2020 4.2    Blood Glucose  Glucose (mg/dL)   Date Value   03/04/2020 147 (H)   03/03/2020 135 (H)    Inflammatory Markers  WBC (10e9/L)   Date Value   03/04/2020 1.5 (L) "   03/03/2020 3.0 (L)     Albumin (g/dL)   Date Value   03/03/2020 2.6 (L)      Sodium (mmol/L)   Date Value   03/04/2020 132 (L)   03/03/2020 128 (L)    Renal  Urea Nitrogen (mg/dL)   Date Value   03/04/2020 35 (H)   03/03/2020 60 (H)     Creatinine (mg/dL)   Date Value   03/04/2020 0.71   03/03/2020 2.44 (H)     Additional  No results found for: TRIG, URINEKETONE     B/P: 99/52, T: 98.6, P: 94, R: 16      MEDICATIONS  Medications reviewed:    dexamethasone  4 mg Oral BID w/meals     fluticasone-vilanterol  1 puff Inhalation Daily     gabapentin  300 mg Oral BID     morphine  15 mg Oral Q12H     polyethylene glycol  17 g Oral Daily     senna-docusate  2 tablet Oral BID     sodium chloride (PF)  3 mL Intracatheter Q8H        lactated ringers 100 mL/hr at 03/04/20 1128          NUTRITION DIAGNOSIS:  Deferred d/t possible change in status (may go comfort cares) with plans for hospice at discharge    NUTRITION INTERVENTIONS  Recommendations / Nutrition Prescription  Diet per MD/Palliative team.  Please formally consult nutrition services if needs arise.      Implementation  Nutrition education: Not appropriate at this time due to patient condition.      Collaboration and Referral of Nutrition care: Discussed POC with Palliative team.    Nutrition Goals  Decisions relating to goals of care w/in 24 hrs.      MONITORING AND EVALUATION:  Progress towards goals will be monitored and evaluated per protocol and Practice Guidelines          Karli Ch RDN, LD  Clinical Dietitian  3rd floor/ICU: [phone]  All other floors: [phone]  Weekend/holiday: [phone]

## 2020-03-04 NOTE — PLAN OF CARE
Neuro: A & O x 4. Calm and cooperative.   VS:  Blood pressure 99/52, pulse 94, temperature 98.6  F (37  C), temperature source Oral, resp. rate 16, SpO2 94 %.  Tele: N/A  Respiratory: WDL  GI: Incontinent of BM.   : Servin in place draining clear yellow urine.   Skin: Large wound to coccyx. WOC consulted.   Pain: to legs, cocyx - given IV dilaudid, scheduled MS contin.   IV: PIV WDL infusing LR @ 100 ml/hr  Transfer: A2 turn and reposition @ 1-2 hours.   Diet: Tolerating PO foods and fluids well.   Plan: TBD. Hospitalist following. Pain/Palliative consult today.

## 2020-03-04 NOTE — PLAN OF CARE
VSS, A&Ox4, regular diet, up with A-1 with gait belt to commode/pivot, IVF, norco for pain and scheduled ms contin, reports constipation, MD paged for bowel regimen meds, buttocks and perirectal area excoriated, raw, red and glossy, cleaned with soap and water, SUSI, encouraging repositioning q 2hrs, palliative and SW consult, family at bedside, will continue to monitor and provide supportive cares.

## 2020-03-04 NOTE — PROGRESS NOTES
[facility] Nurse Inpatient Pressure Injury Assessment   Reason for consultation: Evaluate and treat gluteal cleft      ASSESSMENT  Pressure Injury: from Coccyx to anus within Gluteal cleft suspected Fredo Ulcer , present on admission ,   Pressure Injury is Stage Deep Tissue Pressure Injury (DTPI) vs Unstageable suspected end of life skin necrosis complicated by incontinence  Contributing factor of the pressure injury: pressure, shear, nutrition, immobility and moisture  Status: initial assessment and evolving and deteriorating wound  Recommend provider order: na     TREATMENT PLAN  Buttocks wound: BID and PRN  1. Gently dab with Clementine cleanser and soft wipe to remove stool, no rinse  2. Sprinkle wound with Stoma powder  3. Apply layer of Triad paste to wound, Critic aid paste to outer edges  4. Comfort Measures: Pulsate bed, turn side to side and avoid HOB > 30 degrees unless eating, Sween to dry skin  Orders Written  WO Nurse follow-up plan:prn  Nursing to notify the Provider(s) and re-consult the WOC Nurse if wound(s) deteriorates or new skin concern.    Patient History  According to provider note(s):  69-year-old female with a history of thyroid status post thyroidectomy and breast cancer status post bilateral mastectomy back in 1991 with previous bilateral mastectomies hypertension, moderate persistent asthma, depression, and metastatic lung cancer currently followed by Dr. Shah in [place] as well as Dr. Rosenthal here with [facility] who presents here with a near syncopal event.     Objective Data  Containment of urine/stool: Incontinence Protocol and Indwelling catheter    Current Diet/ Nutrition:  Orders Placed This Encounter      Combination Diet Regular Diet Adult      Output:   I/O last 3 completed shifts:  In: 995 [I.V.:995]  Out: 2600 [Urine:2600]    Risk Assessment:   Sensory Perception: 3-->slightly limited  Moisture: 3-->occasionally moist  Activity: 2-->chairfast  Mobility: 2-->very  limited  Nutrition: 4-->excellent  Friction and Shear: 2-->potential problem  Leonardo Score: 16      Labs:   Recent Labs   Lab 03/04/20  0517 03/03/20  1455   ALBUMIN  --  2.6*   HGB 8.1* 8.8*   WBC 1.5* 3.0*       Physical Exam  Skin inspection to wounds with RN  Patient is high risk for pressure injury development secondary to Stage 4 cancer     Wound Location:  Coccyx and inner gluteal cleft  Date of last Photo 3/4/20    Wound History: present on admission, see above; wound has rapidly progressed since admission and has appearance similar to Fredo Ulcer  Measurements (length x width x depth, in cm) 15 cm x 13 cm  x  Superficial erosion of epidermis    Wound Base:  Deep maroon to near black intact skin at center gluteal cleft extending from Coccyx to anus, superficial erosion of epidermis to lower half and outer edge of wound   Tunneling N/A  Undermining N/A  Palpation of the wound bed: normal and to firm over darker area   Periwound skin: denuded, erythema- blanchable, superficial erosion and related to incontinence X 2; pt now has amaral in place  Drainage:, scant  Description of drainage: serosanguinous  Odor: none  Pain: mild, tender and stinging    Interventions  Current support surface: Standard  will order Pulsate  Current off-loading measures: Pillows  Repositioning aid: Pillows  Visual inspection of wound(s) completed   Tube Securement: na  Wound Care: was done per plan of care.  Supplies: ordered: per POC and discussed with RN  Educated provided: importance of repositioning, plan of care and wound progress  Education provided to: patient  and nurse  Discussed importance of:repositioning every 2 hours, off-loading pressure to wound, their role in pressure injury prevention and moisture management  Discussed plan of care with Patient and Nurse    Vesta Peoples RN, CWON

## 2020-03-04 NOTE — PROGRESS NOTES
Cross cover paged regarding urinary retention with bladder scan of 999 ml.  Reviewed chart.  Patient here for near syncopal event.  Transitioning to hospice and comfort care.  If patient amenable place Servin catheter for comfort care as she will likely have ongoing retention.

## 2020-03-04 NOTE — CONSULTS
[facility]    Palliative Care Consultation   Text Page    Date of Admission:  3/3/2020    Assessment & Plan   Susan Beckham is a 69 year old female who was admitted on 3/3/2020. I was asked by Hospitalist Stephanie Bui MD to see this 69-year old female with metastatic malignancy planning on enrolling to hospice.    Recommendations:  1.  Decisional Capacity -  Questionable, as patient could not recall recent events or what Oncology had told her. Patient does not have an advance directive. Per  informed consent policy next of kin should be involved if patient becomes unable. Her daughter Sameera Cowan is next-of-kin.     2.  Cancer associated pain - Daughter is concerned with the patient's ability to swallow and take her own medication. As the patient has taken 30 mg oral morphine;  2.5 mg IV Dilaudid;  and 2 Norco 5/325 mg (equal analgesic to 90 oral morphine) in the past day it is reasonable to convert to 12 mcg/hr Fentanyl patch and use Norco for breakthrough pain. Mindful that the Fentanyl patch will no reach steady state for more than 24 hours, it is reasonable to give MS Contin 15 mg every 8 hours x 2 doses and continue with Norco and IV Dilaudid for breakthrough pain. Continue Gabapentin 300 mg BID. Decadron 4 mg BID.     3.  Opiate induced constipation - Stool documented yesterday, yet patient continues to feel constipated. Agree with continuing Senna-docusate 2 tablets BID and Noris lax daily    4.  Fredo terminal ulcer - Appreciate recommendations of [facility] Nurse Vesta Peoples RN regarding this signal of the start of the dying process.     5.  Unintentional weight loss of more than 25 pounds - Appreciate input of Registered Dietitian Karli Ch,RD, LD. Patient indicates she would not want feeding tube and would like to eat for comfort and pleasure.       6.  Spiritual Care - Appreciate input of  Godwin Escobar     7. Care Planning- Appreciate input of  AMANDA Kendall  in assistance in dismissal planning to LTC with support of hospice.     Goals of Care: DNR/DNI - Comfort care with plan to dismiss to LTC with support of hospice.     Disease Process/es & Symptoms:  Susan Beckham is a 69 year old patient admitted following a syncopal event. The patient Stage 4 large cell lung cancer which was diagnosed after she was having back pain for about a month and 12/26/2019 Chest CT demonstrated left lower lobe near the lung base measuring 4.2 x 2.3 x 3.7 cm. The patient had lost about 13 pounds in previous 6 months. 1/8/2020 left paraspinal muscle mass was positive for malignancy (neuroendocrine features). 1/13/2020 MRI denae demonstrated multifocal bilateral brain metastasis and T1 hypointense marrow signal.  The patient is followed by Navi Rosenthal MD at [facility] and had completed palliative radiation therapy to paraspinal mass and whole brain radiation therapy. The patient received cycle 1 of carboplatin, etoposide and Atezolizumab on February 25. When she arrived at the clinic March 3 for toxicity check she had a syncopal episode and was sent to [facility] ED per 911. Other significant medical history includes hypertension; asthma; depression; thyroid cancer diagnosed 30 years ago and treated; and breast cancer diagnosed in 1991 status post mastectomy followed by chemotherapy.     Findings & plan of care discussed with: Hospitalist Julio Bueno MD; Bedside nurse Jessika Goode RN;  Godwin Ordaz; and  AMANDA Kendall.   Follow-up plan from palliative team: Will follow closely during this hospitalization.   Thank you for involving us in the patient's care.     Radha Jama MS, RN, CNS, APRN, ACHPN, FAACVPR  Pain and Palliative Care  Pager [phone]  Office [phone]     Time Spent on this Encounter   Total unit/floor time 1:10 PM until 2:35 PM, time consisted of the following, examination of the patient, reviewing the record and completing  "documentation. >50% of time spent in counseling and coordination of care.  Time spend counseling with patient and family consisted of the following topics, goals of care, advance care planning, care planning for discharge and symptom management.  Time spent in coordination of care with .       Primary Care Physician   Ayde Wong MD    Chief Complaint   Loss of Consciousness    History is obtained from the patient, electronic health record and patient's daughter    Decision-Making & Goals of Care:  Discussed on 2020 with Radha MAY, CNS:   Met with Susan as she was resting in bed. She introduced me to her brother, sister, daughter (Sameera) and grandson. I explained my role and asked Susan if she wanted her family to remain for our interview which she agreed. As we discussed her symptoms her daughter asked the other family members to leave and explained that she was being mindful that her mother is a private person \"she doesn't complain and she probably doesn't want them to know how she is really feeling\". Sameera explained that her mother is not safe at home and explained how she had found her and brought her to the hospital. Susan was tearful and acknowledged \"I didn't know it was that bad\". I asked Susan what her Oncologist had told her. Susan explained \"I haven't seen anyone for the cancer. I don't know what is going on.\" Sameera explained that her mother \"had cyber-knife and the first dose of chemo, so she's done with all of that.\" Susan tearfully explained \"I didn't know all that. It's not good.\" Sameera explained that she had called all of their family in to say good-byes and explained that her sister had  of liver failure and the 18 year-old grandson is her sister's son. Sameera acknowledged that Susan is very close to her 9 year old granddaughter, who will be heart-broken with her mother's death. Sameera explained that with her mother's breast cancer her only wish was to see her reach 18 " years and now she's 40 years old! We discussed options for the future. Susan confirmed DNR/DNI and request a comfort focused plan. We explored the benefit of hospice. Sameera acknowledged that they would be unable to care for Susan at home and Susan's boyfriend lives in [place], so they need a plan to be close to him. I explained that the unit  would assist in placement, but to be mindful that the hospice benefit does not cover the cost of room and board. Options may be available from [facility] which is free to [facility] which is more that $600 dollars a day, but their are options that are in the community ([place], [place] and others) that the  can help them to consider. We completed a POLST indicating the patient's preference for DNR/DNI - Comfort focused care. Susan does have an advanced directive which had not yet been notarized, so I called our teams  Kasandra Rhoades to help with this document.         Past Medical History   I have reviewed this patient's medical history and updated it with pertinent information if needed.   Past Medical History:   Diagnosis Date     Hypertension      Malignant neoplasm (H)     breast cancer, double mastectomy 1991     Thyroid disease     thyroidectomy       Past Surgical History   I have reviewed this patient's surgical history and updated it with pertinent information if needed.  Past Surgical History:   Procedure Laterality Date     BREAST SURGERY       GYN SURGERY       HEAD & NECK SURGERY       HYSTERECTOMY      complete, 1998     MASTECTOMY      double, 1991     THYROIDECTOMY         Prior to Admission Medications   Prior to Admission Medications   Prescriptions Last Dose Informant Patient Reported? Taking?   HYDROcodone-acetaminophen (NORCO) 5-325 MG tablet   Yes Yes   Sig: Take 1 tablet by mouth every 4 hours as needed for severe pain   OLANZapine (ZYPREXA) 2.5 MG tablet   Yes Yes   Sig: Take 2.5 mg by  mouth daily Or as directed on chemo days   albuterol (PROAIR HFA/PROVENTIL HFA/VENTOLIN HFA) 108 (90 Base) MCG/ACT inhaler   Yes Yes   Sig: Inhale 2 puffs into the lungs every 6 hours as needed for shortness of breath / dyspnea or wheezing   cyclobenzaprine (FLEXERIL) 5 MG tablet   Yes Yes   Sig: Take 5 mg by mouth 3 times daily as needed for muscle spasms   dexamethasone (DECADRON) 4 MG tablet 3/3/2020 at am  Yes Yes   Sig: Take 4 mg by mouth 2 times daily (with meals)   gabapentin (NEURONTIN) 300 MG capsule 3/3/2020 at am  Yes Yes   Sig: Take 300 mg by mouth 2 times daily   mometasone-formoterol (DULERA) 200-5 MCG/ACT inhaler 3/3/2020 at am  Yes Yes   Sig: Inhale 2 puffs into the lungs 2 times daily   morphine (MS CONTIN) 15 MG CR tablet 3/3/2020 at 0830  Yes Yes   Sig: Take 15 mg by mouth every 12 hours   prochlorperazine (COMPAZINE) 10 MG tablet   Yes Yes   Sig: Take 10 mg by mouth every 6 hours as needed for nausea or vomiting      Facility-Administered Medications: None     Allergies   Allergies   Allergen Reactions     Sulfa Drugs Hives       Social History   Living situation: Living alone and driving her care prior to this admission  Family system: . Her daughter is supportive (another daughter   from liver failure) and grandchildren age 18 and 9 are supportive  Functional status: Needs help with ADLs   Employment/education: Retired from office work at [employer]  History of substance use/abuse:  reports that she has never smoked. She does not have any smokeless tobacco history on file.  has no history on file for alcohol.  Mu-ism affiliation: Confucianism    Family History   I have reviewed this patient's family history and updated it with pertinent information if needed.   No family history on file.    Review of Systems   The 10 point Review of Systems is negative other than noted in the HPI or here.     Palliative Symptom Review (0=no symptom/no concern, 1=mild, 2=moderate,  3=severe):      Pain: 2-moderate      Fatigue: 2-moderate      Nausea: 0-none      Constipation: 1-mild      Diarrhea: 0-none      Depressive Symptoms: 1-mild      Anxiety: 1-mild      Drowsiness: 0-none      Poor Appetite: 2-moderate      Shortness of Breath: 0-none      Insomnia: 0-none        Physical Exam   Temp:  [98.4  F (36.9  C)-99.4  F (37.4  C)] 98.6  F (37  C)  Pulse:  [] 94  Heart Rate:  [106-115] 110  Resp:  [15-22] 16  BP: ()/(49-99) 99/52  SpO2:  [92 %-98 %] 94 %  0 lbs 0 oz  GEN:   female. Alert, oriented to self and situation, but could not recall recent events. Denies pain, yet winces and grimaces with movement in bed.  HEENT:  Normocephalic/atraumatic, temporal wasting, no scleral icterus, no nasal discharge, mouth moist.  CV:  RRR, S1, S2; no murmurs or other irregularities noted.  +3 DP/PT pulses bilaterally; no edema BLE.  RESP:  Clear to auscultation bilaterally without rales/rhonchi/wheezing/retractions.  Symmetric chest rise on inhalation noted.  Normal respiratory effort.  ABD:  Rounded, soft, non-tender/non-distended.  +BS  EXT:  CMS intact x 4.     M/S:   Complained of buttock pain.    SKIN:  Warm and dry to touch, refer to [facility] documentation of Fredo ulcer.    NEURO: Symmetric strength +5/5.  Sensation to touch intact all extremities.   There is no area of allodynia or hyperesthesia.  Psych:  Normal affect.  Calm, cooperative, conversant.     Data   Results for orders placed or performed during the hospital encounter of 03/03/20   Comprehensive metabolic panel     Status: Abnormal   Result Value Ref Range    Sodium 128 (L) 133 - 144 mmol/L    Potassium 4.2 3.4 - 5.3 mmol/L    Chloride 94 94 - 109 mmol/L    Carbon Dioxide 24 20 - 32 mmol/L    Anion Gap 10 3 - 14 mmol/L    Glucose 135 (H) 70 - 99 mg/dL    Urea Nitrogen 60 (H) 7 - 30 mg/dL    Creatinine 2.44 (H) 0.52 - 1.04 mg/dL    GFR Estimate 20 (L) >60 mL/min/[1.73_m2]    GFR Estimate If Black 23 (L) >60  mL/min/[1.73_m2]    Calcium 9.2 8.5 - 10.1 mg/dL    Bilirubin Total 1.0 0.2 - 1.3 mg/dL    Albumin 2.6 (L) 3.4 - 5.0 g/dL    Protein Total 6.2 (L) 6.8 - 8.8 g/dL    Alkaline Phosphatase 49 40 - 150 U/L    ALT 29 0 - 50 U/L    AST 40 0 - 45 U/L   CBC with platelets differential     Status: Abnormal   Result Value Ref Range    WBC 3.0 (L) 4.0 - 11.0 10e9/L    RBC Count 2.88 (L) 3.8 - 5.2 10e12/L    Hemoglobin 8.8 (L) 11.7 - 15.7 g/dL    Hematocrit 26.3 (L) 35.0 - 47.0 %    MCV 91 78 - 100 fl    MCH 30.6 26.5 - 33.0 pg    MCHC 33.5 31.5 - 36.5 g/dL    RDW 17.6 (H) 10.0 - 15.0 %    Platelet Count 176 150 - 450 10e9/L    Diff Method Manual Differential     % Neutrophils 95.0 %    % Lymphocytes 4.0 %    % Monocytes 1.0 %    % Eosinophils 0.0 %    % Basophils 0.0 %    Absolute Neutrophil 2.9 1.6 - 8.3 10e9/L    Absolute Lymphocytes 0.1 (L) 0.8 - 5.3 10e9/L    Absolute Monocytes 0.0 0.0 - 1.3 10e9/L    Absolute Eosinophils 0.0 0.0 - 0.7 10e9/L    Absolute Basophils 0.0 0.0 - 0.2 10e9/L    Anisocytosis Slight     Macrocytes Present     Platelet Estimate       Automated count confirmed.  Platelet morphology is normal.   Lactic acid whole blood     Status: None   Result Value Ref Range    Lactic Acid 1.4 0.7 - 2.0 mmol/L   Glucose by meter     Status: Abnormal   Result Value Ref Range    Glucose 136 (H) 70 - 99 mg/dL   Lactic acid level STAT     Status: None   Result Value Ref Range    Lactate for Sepsis Protocol 1.3 0.7 - 2.0 mmol/L   Basic metabolic panel     Status: Abnormal   Result Value Ref Range    Sodium 132 (L) 133 - 144 mmol/L    Potassium 3.4 3.4 - 5.3 mmol/L    Chloride 103 94 - 109 mmol/L    Carbon Dioxide 23 20 - 32 mmol/L    Anion Gap 6 3 - 14 mmol/L    Glucose 147 (H) 70 - 99 mg/dL    Urea Nitrogen 35 (H) 7 - 30 mg/dL    Creatinine 0.71 0.52 - 1.04 mg/dL    GFR Estimate 87 >60 mL/min/[1.73_m2]    GFR Estimate If Black >90 >60 mL/min/[1.73_m2]    Calcium 8.5 8.5 - 10.1 mg/dL   CBC with platelets     Status:  Abnormal   Result Value Ref Range    WBC 1.5 (L) 4.0 - 11.0 10e9/L    RBC Count 2.66 (L) 3.8 - 5.2 10e12/L    Hemoglobin 8.1 (L) 11.7 - 15.7 g/dL    Hematocrit 24.3 (L) 35.0 - 47.0 %    MCV 91 78 - 100 fl    MCH 30.5 26.5 - 33.0 pg    MCHC 33.3 31.5 - 36.5 g/dL    RDW 17.5 (H) 10.0 - 15.0 %    Platelet Count 162 150 - 450 10e9/L   EKG 12 lead     Status: None   Result Value Ref Range    Interpretation ECG Click View Image link to view waveform and result

## 2020-03-04 NOTE — PROGRESS NOTES
"[facility]    Medicine Progress Note - Hospitalist Service       Date of Admission:  3/3/2020  Assessment & Plan      69-year-old female with a history of thyroid cancer status post thyroidectomy (>30yr ago), breast cancer status post bilateral mastectomy/chemo (1991), hypertension, moderate persistent asthma, depression, and metastatic lung cancer (brain, bone) currently followed by Dr. Shah in [place] as well as Dr. Rosenthal here with [place] [facility]. Admitted 3/3/2020 for pre-syncope, hypotension, renal failure    End of life due to metastatic lung cancer    - mets to brain/bone    - was to establish care with Dr. Rosenthal, but was sent to the ED from her appt due to pre-syncope/hypotension    - patient to enroll in hospice    - Pall Care involved    - social work for placement    - pain control: current pain is \"0\", changes to regimen being made by Pain/Pall Care    Acute renal failure    - resolved with IVF    Hyponatremia    - improved    Anemia    - chronic    Family in room. Questions answered    Diet: Combination Diet Regular Diet Adult    DVT Prophylaxis: will be comfort measures  Servin Catheter: in place, indication: End of Life  Code Status: DNR/DNI      Disposition Plan   Expected discharge: 1-2, recommended to transitional care unit once safe disposition plan/ TCU bed available.  Entered: Julio Bueno MD 03/04/2020, 2:05 PM       The patient's care was discussed with the Bedside Nurse, Care Coordinator/, Patient and Palliative Care Consultant.    Julio Bueno MD  Hospitalist Service  [facility]    ______________________________________________________________________    Interval History   Patient seen with family in room. Pain was controlled at 0/10. No new complaints      Data reviewed today: I reviewed all medications, new labs and imaging results over the last 24 hours. I personally reviewed no images or EKG's today.    Physical Exam   Vital Signs: " Temp: 98.6  F (37  C) Temp src: Oral BP: 99/52 Pulse: 94 Heart Rate: 110 Resp: 16 SpO2: 94 % O2 Device: None (Room air)    Weight: 0 lbs 0 oz  Constitutional: awake, alert, cooperative, no apparent distress, and appears stated age  Eyes: Lids and lashes normal, pupils equal, round and reactive to light, extra ocular muscles intact, sclera clear, conjunctiva normal  ENT: Normocephalic, without obvious abnormality, atraumatic, sinuses nontender on palpation, external ears without lesions, oral pharynx with moist mucous membranes, tonsils without erythema or exudates, gums normal and good dentition.  Respiratory: No increased work of breathing, good air exchange, clear to auscultation bilaterally, no crackles or wheezing  Cardiovascular: Normal apical impulse, regular rate and rhythm, normal S1 and S2, no S3 or S4, and no murmur noted  GI: No scars, normal bowel sounds, soft, non-distended, non-tender, no masses palpated, no hepatosplenomegally  Skin: no bruising or bleeding  Musculoskeletal: no lower extremity pitting edema present    Data   Recent Labs   Lab 03/04/20  0517 03/03/20  1455   WBC 1.5* 3.0*   HGB 8.1* 8.8*   MCV 91 91    176   * 128*   POTASSIUM 3.4 4.2   CHLORIDE 103 94   CO2 23 24   BUN 35* 60*   CR 0.71 2.44*   ANIONGAP 6 10   DAYAMI 8.5 9.2   * 135*   ALBUMIN  --  2.6*   PROTTOTAL  --  6.2*   BILITOTAL  --  1.0   ALKPHOS  --  49   ALT  --  29   AST  --  40     No results found for this or any previous visit (from the past 24 hour(s)).

## 2020-03-04 NOTE — PROGRESS NOTES
"SPIRITUAL HEALTH SERVICES Progress Note  [facility] Obs    SH consult per palliative team consult.    Met with pt, Susan, and her dtr, Sameera.   Oriented pt/family to SH support. Susan defers consult as she has \"had a lot of conversations today.\"   Will f/u tomorrow to assess for emotional/spiritual needs.   SH remains available.       TONI Taylor.  Staff    Pager #[phone]   Pronouns: he/him/his    "

## 2020-03-04 NOTE — CONSULTS
Care Transition Initial Assessment - SW     Met with: Patient  Active Problems:    TRACY (acute kidney injury) (H)       DATA  Lives With: alone      Quality of Family Relationships: helpful, involved, supportive  Description of Support System: Supportive, Involved  Who is your support system?: Significant Other, Children  Support Assessment: Adequate family and caregiver support, Adequate social supports.   Identified issues/concerns regarding health management: Pt admitted 3/3 after a syncopal episode. Pt has thyroid and breast cancer and follows with Dr. Rosenthal with [facility]. Her last chemotherapy was last week. Pt resides in a home alone in [place]. Her daughter Sameera (TRACIE) and her fiance John who resides in [place] are very supportive. Palliative care met with pt today and she has decide to enroll in hospice. SW met with pt and provide a hospice options list as well as SNF list and answered questions about next steps for her. She reports that she is leaning towards a discharge to LTC with hospice enrollment, and plans to discuss facilities with family today.       Quality of Family Relationships: helpful, involved, supportive     ASSESSMENT  Cognitive Status:  awake, alert and oriented  Concerns to be addressed: Discharge planning, hospice.     PLAN  Patient anticipates discharging to:  Hospice-likely LTC facility. SNF list and Residential hospice list provided to pt. Requested that SW follow up in the morning with facility preferences. SW will remain available and continue to follow.     TRAVIS Kendall   Inpatient Care Coordination  [facility]   [phone]

## 2020-03-04 NOTE — PLAN OF CARE
Transferred patient via bed to room #545. Daughter Sameera was notified and patient took all belongings along with inhalers with her.

## 2020-03-04 NOTE — PROGRESS NOTES
Inpatient Progress Note:    /54 (BP Location: Left arm)   Pulse 94   Temp 98.4  F (36.9  C) (Oral)   Resp 16   SpO2 95%        Orientation: Alert and Oriented x 4  Neuro: Intact  Pain status: Constant sharp left sided chest pain, MS contin and IV dilaudid   Activity: Assist of 2 with walker and gait belt, calls appropriately   Peripheral edema: +1 trace edema in BLE   Resp: Diminished  Cardiac: WNL  GI: Constipated, started Senna  :  Up to commode, attempted to void, bladder scanned, over 999 ml, received amaral orders, 1800 ml returned.   Skin: buttocks, coccyx and sacrum, red and excoriated, blanchable. Barrier cream applied  LDA: PIV, amaral   Infusions: NS at 100ml/hr   Pertinent Labs: BMP and CBC this AM   Diet: Regular   Consults: Palliative, social work and hospitalist   Discharge Plan: Pending palliative consult       Upon AM amaral care at 0530a, smear present, upon turning her coccyx was  Dark red/black,  nonblanchable, barrier cream applied, turning c5kthur. WOC consult placed.       Thomas Woods RN

## 2020-03-04 NOTE — PLAN OF CARE
ROOM # 222    Living Situation (if not independent, order SW consult): alone  Facility name: N/A  : Sameera (daughter)    Activity level at baseline: ind, needs assistance with stairs from one person - walker ordered  Activity level on admit: A-1      Discussed discharge goals and expectations with patient/family.

## 2020-03-05 ENCOUNTER — DOCUMENTATION ONLY (OUTPATIENT)
Dept: OTHER | Facility: CLINIC | Age: 70
End: 2020-03-05

## 2020-03-05 PROCEDURE — 40000275 ZZH STATISTIC RCP TIME EA 10 MIN

## 2020-03-05 PROCEDURE — 94640 AIRWAY INHALATION TREATMENT: CPT

## 2020-03-05 PROCEDURE — 99233 SBSQ HOSP IP/OBS HIGH 50: CPT | Performed by: CLINICAL NURSE SPECIALIST

## 2020-03-05 PROCEDURE — 25000131 ZZH RX MED GY IP 250 OP 636 PS 637: Performed by: INTERNAL MEDICINE

## 2020-03-05 PROCEDURE — 25000128 H RX IP 250 OP 636: Performed by: HOSPITALIST

## 2020-03-05 PROCEDURE — 12000000 ZZH R&B MED SURG/OB

## 2020-03-05 PROCEDURE — 25000132 ZZH RX MED GY IP 250 OP 250 PS 637: Performed by: INTERNAL MEDICINE

## 2020-03-05 PROCEDURE — 25000132 ZZH RX MED GY IP 250 OP 250 PS 637: Performed by: CLINICAL NURSE SPECIALIST

## 2020-03-05 PROCEDURE — 25000128 H RX IP 250 OP 636: Performed by: CLINICAL NURSE SPECIALIST

## 2020-03-05 RX ORDER — OLANZAPINE 2.5 MG/1
2.5 TABLET, FILM COATED ORAL AT BEDTIME
Qty: 15 TABLET | Refills: 0 | Status: SHIPPED | OUTPATIENT
Start: 2020-03-05

## 2020-03-05 RX ORDER — HYDROMORPHONE HYDROCHLORIDE 2 MG/1
2 TABLET ORAL
Qty: 30 TABLET | Refills: 0 | Status: SHIPPED | OUTPATIENT
Start: 2020-03-05

## 2020-03-05 RX ORDER — HALOPERIDOL 0.5 MG/1
1 TABLET ORAL EVERY 6 HOURS PRN
Qty: 30 TABLET | Refills: 1 | Status: SHIPPED | OUTPATIENT
Start: 2020-03-05

## 2020-03-05 RX ORDER — HYDROMORPHONE HYDROCHLORIDE 1 MG/ML
0.5 INJECTION, SOLUTION INTRAMUSCULAR; INTRAVENOUS; SUBCUTANEOUS
Status: DISCONTINUED | OUTPATIENT
Start: 2020-03-05 | End: 2020-03-06 | Stop reason: HOSPADM

## 2020-03-05 RX ORDER — SENNOSIDES 8.6 MG
1-2 TABLET ORAL 2 TIMES DAILY
Qty: 100 TABLET | Refills: 1 | Status: SHIPPED | OUTPATIENT
Start: 2020-03-05

## 2020-03-05 RX ORDER — FENTANYL 25 UG/1
25 PATCH TRANSDERMAL
Status: DISCONTINUED | OUTPATIENT
Start: 2020-03-05 | End: 2020-03-06 | Stop reason: HOSPADM

## 2020-03-05 RX ORDER — ACETAMINOPHEN 325 MG/1
325-650 TABLET ORAL EVERY 4 HOURS PRN
Qty: 100 TABLET | Refills: 1 | Status: SHIPPED | OUTPATIENT
Start: 2020-03-05

## 2020-03-05 RX ORDER — GABAPENTIN 300 MG/1
300 CAPSULE ORAL 3 TIMES DAILY
Status: DISCONTINUED | OUTPATIENT
Start: 2020-03-05 | End: 2020-03-06 | Stop reason: HOSPADM

## 2020-03-05 RX ORDER — BISACODYL 10 MG
SUPPOSITORY, RECTAL RECTAL
Qty: 12 SUPPOSITORY | Refills: 1 | Status: SHIPPED | OUTPATIENT
Start: 2020-03-05

## 2020-03-05 RX ORDER — GABAPENTIN 300 MG/1
300 CAPSULE ORAL 3 TIMES DAILY
Qty: 90 CAPSULE | Refills: 0 | Status: SHIPPED | OUTPATIENT
Start: 2020-03-05

## 2020-03-05 RX ORDER — ATROPINE SULFATE 10 MG/ML
2-4 SOLUTION/ DROPS OPHTHALMIC
Qty: 5 ML | Refills: 1 | Status: SHIPPED | OUTPATIENT
Start: 2020-03-05

## 2020-03-05 RX ORDER — ACETAMINOPHEN 650 MG/1
650 SUPPOSITORY RECTAL EVERY 4 HOURS PRN
Qty: 12 SUPPOSITORY | Refills: 1 | Status: SHIPPED | OUTPATIENT
Start: 2020-03-05

## 2020-03-05 RX ORDER — FENTANYL 25 UG/1
1 PATCH TRANSDERMAL
Qty: 1 PATCH | Refills: 0 | Status: SHIPPED | OUTPATIENT
Start: 2020-03-05

## 2020-03-05 RX ORDER — LORAZEPAM 0.5 MG/1
0.5 TABLET ORAL EVERY 4 HOURS PRN
Qty: 30 TABLET | Refills: 1 | Status: SHIPPED | OUTPATIENT
Start: 2020-03-05

## 2020-03-05 RX ADMIN — GABAPENTIN 300 MG: 300 CAPSULE ORAL at 07:45

## 2020-03-05 RX ADMIN — HYDROMORPHONE HYDROCHLORIDE 0.5 MG: 1 INJECTION, SOLUTION INTRAMUSCULAR; INTRAVENOUS; SUBCUTANEOUS at 17:39

## 2020-03-05 RX ADMIN — HYDROMORPHONE HYDROCHLORIDE 0.5 MG: 1 INJECTION, SOLUTION INTRAMUSCULAR; INTRAVENOUS; SUBCUTANEOUS at 03:23

## 2020-03-05 RX ADMIN — HYDROMORPHONE HYDROCHLORIDE 0.5 MG: 1 INJECTION, SOLUTION INTRAMUSCULAR; INTRAVENOUS; SUBCUTANEOUS at 13:03

## 2020-03-05 RX ADMIN — HYDROMORPHONE HYDROCHLORIDE 0.5 MG: 1 INJECTION, SOLUTION INTRAMUSCULAR; INTRAVENOUS; SUBCUTANEOUS at 07:41

## 2020-03-05 RX ADMIN — GABAPENTIN 300 MG: 300 CAPSULE ORAL at 17:39

## 2020-03-05 RX ADMIN — CYCLOBENZAPRINE HYDROCHLORIDE 5 MG: 5 TABLET, FILM COATED ORAL at 14:23

## 2020-03-05 RX ADMIN — SENNOSIDES AND DOCUSATE SODIUM 2 TABLET: 8.6; 5 TABLET ORAL at 07:45

## 2020-03-05 RX ADMIN — FLUTICASONE FUROATE AND VILANTEROL TRIFENATATE 1 PUFF: 200; 25 POWDER RESPIRATORY (INHALATION) at 08:19

## 2020-03-05 RX ADMIN — GABAPENTIN 300 MG: 300 CAPSULE ORAL at 21:12

## 2020-03-05 RX ADMIN — FENTANYL 1 PATCH: 25 PATCH, EXTENDED RELEASE TRANSDERMAL at 15:09

## 2020-03-05 RX ADMIN — POLYETHYLENE GLYCOL 3350 17 G: 17 POWDER, FOR SOLUTION ORAL at 07:45

## 2020-03-05 RX ADMIN — HYDROMORPHONE HYDROCHLORIDE 0.5 MG: 1 INJECTION, SOLUTION INTRAMUSCULAR; INTRAVENOUS; SUBCUTANEOUS at 15:06

## 2020-03-05 RX ADMIN — DEXAMETHASONE 4 MG: 2 TABLET ORAL at 17:39

## 2020-03-05 RX ADMIN — HYDROCODONE BITARTRATE AND ACETAMINOPHEN 2 TABLET: 5; 325 TABLET ORAL at 21:12

## 2020-03-05 RX ADMIN — SENNOSIDES AND DOCUSATE SODIUM 2 TABLET: 8.6; 5 TABLET ORAL at 19:25

## 2020-03-05 RX ADMIN — HYDROMORPHONE HYDROCHLORIDE 0.5 MG: 1 INJECTION, SOLUTION INTRAMUSCULAR; INTRAVENOUS; SUBCUTANEOUS at 19:25

## 2020-03-05 RX ADMIN — DEXAMETHASONE 4 MG: 2 TABLET ORAL at 07:45

## 2020-03-05 RX ADMIN — HYDROCODONE BITARTRATE AND ACETAMINOPHEN 2 TABLET: 5; 325 TABLET ORAL at 08:51

## 2020-03-05 RX ADMIN — HYDROCODONE BITARTRATE AND ACETAMINOPHEN 2 TABLET: 5; 325 TABLET ORAL at 13:49

## 2020-03-05 ASSESSMENT — ACTIVITIES OF DAILY LIVING (ADL)
ADLS_ACUITY_SCORE: 24
ADLS_ACUITY_SCORE: 26
ADLS_ACUITY_SCORE: 24
ADLS_ACUITY_SCORE: 24
ADLS_ACUITY_SCORE: 26
ADLS_ACUITY_SCORE: 24

## 2020-03-05 NOTE — PROGRESS NOTES
SPIRITUAL HEALTH SERVICES Progress Note  FR Med. Surg. 5    Attempted to see pt Susan per Palliative Care consult.  This morning Susan had several family members visiting, including her daughter Sameera.  Briefly oriented them to  services.  Susan welcomed this author's offer to return later; however upon returning in the afternoon, Susan was sleeping.    This author will see pt when she is available.    Daniel Arredondo M.Div., [facility]  Staff   Pager [phone]

## 2020-03-05 NOTE — PROGRESS NOTES
[facility]  Palliative Care Progress Note  Text Page    Met with the patient briefly in the morning, but as she was eating breakfast I offered to see her later in the day when her family was present. Appreciate the page from bedside nurse Enrike Hudson RN that the patient was having pain this afternoon. I met with Susan as she was resting in bed. Her daughter, grandson, and brother at bedside and Susan introduced her fiance John Piper. Susan acknowledged her pain had worsened during the course of the morning. We discuss pain plan at length.       Assessment & Plan   1.  Decisional Capacity -  Questionable. Patient has an advance directive dated 2/21/2020. Her daughter Sameera Cowan is health care agent. First alternate health care agent is Tay Matos.     2.  Cancer associated pain - Reasonable to increase Fentanyl patch to 25 mcg/hr and use Norco for breakthrough pain. Increase Gabapentin 300 mg to TID. Decadron 4 mg BID.      3.  Opiate induced constipation - Patient denies with stool documented today. Continue Senna-docusate 2 tablets BID and Noris lax daily.     4.  Fredo terminal ulcer - Appreciate recommendations of [facility] Nurse Vesta Peoples RN regarding this signal of the start of the dying process.      5.  Unintentional weight loss of more than 25 pounds - Appreciate input of Registered Dietitian Karli Ch,RD, LD. Patient indicates she would not want feeding tube and would like to eat for comfort and pleasure.        6.  Spiritual Care - Appreciate input of  ALETHEA Govea.      7. Care Planning- Appreciate input of  Corinne White, LSW in dismissal arrangements to [facility] with support of [facility].       Goals of Care: DNR/DNI - Comfort care with plan to dismiss to LTC with support of hospice    Radha Jama MS, RN, CNS, APRN, ACHPN, FAACVPR  Pain and Palliative Care  Pager [phone]  Office [phone]       Time Spent on this  Encounter   Total unit/floor time 2:20 PM until 3 PM, time consisted of the following, examination of the patient, reviewing the record and completing documentation. >50% of time spent in counseling and coordination of care.  Time spend counseling with patient and family consisted of the following topics, care planning for discharge and symptom management.  Time spent in coordination of care with Bedside Nurse Enrike Hudson RN,  ALETHEA Govea  and  Corinne White, LSW.     Interval History   Chart reviewed - continues to have significant abdominal pain      Palliative Symptom Review (0=no symptom/no concern, 1=mild, 2=moderate, 3=severe):      Pain: 3-severe      Fatigue: 2-moderate      Nausea: 0-none      Constipation: 0-none      Diarrhea: 0-none      Depressive Symptoms: 0-none      Anxiety: 1-mild      Drowsiness: 1-mild      Poor Appetite: 2-moderate      Shortness of Breath: 0-none      Insomnia: 0-none        Physical Exam   Temp:  [96.1  F (35.6  C)-99.3  F (37.4  C)] 96.8  F (36  C)  Pulse:  [86-88] 88  Heart Rate:  [79-97] 87  Resp:  [12-20] 16  BP: (110-126)/(57-71) 110/60  SpO2:  [91 %-100 %] 91 %  0 lbs 0 oz  GEN:  Alert, oriented to self and situation, intermittently wincing due to abdominal pain.  HEENT:  Normocephalic/atraumatic, no scleral icterus, no nasal discharge, mouth moist.  CV:  RRR, S1, S2; no murmurs or other irregularities noted.  +3 DP/PT pulses bilaterally; no edema BLE.  RESP:  Clear to auscultation bilaterally without rales/rhonchi/wheezing/retractions.  Symmetric chest rise on inhalation noted.  Normal respiratory effort.  ABD:  Rounded, soft, non-tender/non-distended.  +BS  PAIN BEHAVIOR: Cooperative  Psych:  Normal affect.  Calm, cooperative, conversant appropriately.    Medications       dexamethasone  4 mg Oral BID w/meals     fentaNYL  12 mcg Transdermal Q72H     fentaNYL   Transdermal Q8H     fluticasone-vilanterol  1 puff Inhalation Daily      gabapentin  300 mg Oral BID     polyethylene glycol  17 g Oral Daily     senna-docusate  2 tablet Oral BID     sodium chloride (PF)  3 mL Intracatheter Q8H       Data   No results found for this or any previous visit (from the past 24 hour(s)).

## 2020-03-05 NOTE — PLAN OF CARE
VSS. A&Ox3, forgetful, lethargic. Prn IV dilaudid 0.5 mg x3 during shift, prn norco x2 during shift w/ minimal effect for pain control, fet patch adjusted to 235 mcg per pain/palliative. Repositioned frequently. Wound care to bottom completed. Servin patent w/ good urine op. Soc work following for discharge to [facility] 3/6 after 11 am hospice meeting.

## 2020-03-05 NOTE — PROGRESS NOTES
CM: Met with pt and family to address d.c planning support. PT and family have agreed that she is unable to return home due to care needs. SW spoke with pt and about about Hospice options. Pt has the means to pay for Residential Hospice support.. After discussion options pt and family agree to request placement at [facility]. They are aware of the daily cost    Offered options for Hospice support. PT would like to stay under her Primary Care with [facility]. Called [facility] and requested Visit for tomorrow at 11:00 am.. Richelle LIMON and Aliza HILL will be here for admission. They have requested we fill meds here and will connect with the pharmacy tomorrow for billing code to bill them for medication support.    Spoke with pt and family about transport and cost. Pt has a large open wound now on her bottom and unable to stand or picot at this time.. Family have agreed to set up stretcher transport and aware of the cost    Barbara aware of referral and has bed accepted.     P: [facility] will set up stretcher transport for tomorrow for 1600  .

## 2020-03-05 NOTE — PLAN OF CARE
Lung cancer with mets  Co pain in stomach - MS contin, Dilaudid, Norco given  Melatonin for sleep   Assist x 2, turned in bed. Legs are very weak  Wound care done on buttocks wound   Incont of stool, amaral patent  Discharge pending

## 2020-03-06 VITALS
DIASTOLIC BLOOD PRESSURE: 67 MMHG | RESPIRATION RATE: 18 BRPM | HEART RATE: 88 BPM | SYSTOLIC BLOOD PRESSURE: 119 MMHG | OXYGEN SATURATION: 97 % | TEMPERATURE: 97.5 F

## 2020-03-06 PROCEDURE — 94640 AIRWAY INHALATION TREATMENT: CPT

## 2020-03-06 PROCEDURE — 40000275 ZZH STATISTIC RCP TIME EA 10 MIN

## 2020-03-06 PROCEDURE — 25000131 ZZH RX MED GY IP 250 OP 636 PS 637: Performed by: INTERNAL MEDICINE

## 2020-03-06 PROCEDURE — 25000132 ZZH RX MED GY IP 250 OP 250 PS 637: Performed by: CLINICAL NURSE SPECIALIST

## 2020-03-06 PROCEDURE — 25000132 ZZH RX MED GY IP 250 OP 250 PS 637: Performed by: HOSPITALIST

## 2020-03-06 PROCEDURE — 25000132 ZZH RX MED GY IP 250 OP 250 PS 637: Performed by: INTERNAL MEDICINE

## 2020-03-06 PROCEDURE — 99239 HOSP IP/OBS DSCHRG MGMT >30: CPT | Performed by: HOSPITALIST

## 2020-03-06 PROCEDURE — 25000128 H RX IP 250 OP 636: Performed by: CLINICAL NURSE SPECIALIST

## 2020-03-06 RX ORDER — NYSTATIN 100000/ML
500000 SUSPENSION, ORAL (FINAL DOSE FORM) ORAL 4 TIMES DAILY
Qty: 400 ML | Refills: 0 | Status: SHIPPED | OUTPATIENT
Start: 2020-03-06 | End: 2020-04-05

## 2020-03-06 RX ORDER — DIPHENHYDRAMINE HYDROCHLORIDE AND LIDOCAINE HYDROCHLORIDE AND ALUMINUM HYDROXIDE AND MAGNESIUM HYDRO
5-10 KIT EVERY 6 HOURS PRN
Qty: 237 ML | Refills: 0 | Status: SHIPPED | OUTPATIENT
Start: 2020-03-06

## 2020-03-06 RX ORDER — MINERAL OIL 100 G/100G
1 OIL RECTAL ONCE
Status: COMPLETED | OUTPATIENT
Start: 2020-03-06 | End: 2020-03-06

## 2020-03-06 RX ADMIN — GABAPENTIN 300 MG: 300 CAPSULE ORAL at 08:21

## 2020-03-06 RX ADMIN — HYDROCODONE BITARTRATE AND ACETAMINOPHEN 2 TABLET: 5; 325 TABLET ORAL at 07:03

## 2020-03-06 RX ADMIN — HYDROCODONE BITARTRATE AND ACETAMINOPHEN 2 TABLET: 5; 325 TABLET ORAL at 02:22

## 2020-03-06 RX ADMIN — HYDROMORPHONE HYDROCHLORIDE 0.5 MG: 1 INJECTION, SOLUTION INTRAMUSCULAR; INTRAVENOUS; SUBCUTANEOUS at 13:07

## 2020-03-06 RX ADMIN — SENNOSIDES AND DOCUSATE SODIUM 2 TABLET: 8.6; 5 TABLET ORAL at 08:21

## 2020-03-06 RX ADMIN — MINERAL OIL 1 ENEMA: 100 ENEMA RECTAL at 11:35

## 2020-03-06 RX ADMIN — FLUTICASONE FUROATE AND VILANTEROL TRIFENATATE 1 PUFF: 200; 25 POWDER RESPIRATORY (INHALATION) at 09:02

## 2020-03-06 RX ADMIN — POLYETHYLENE GLYCOL 3350 17 G: 17 POWDER, FOR SOLUTION ORAL at 08:21

## 2020-03-06 RX ADMIN — DEXAMETHASONE 4 MG: 2 TABLET ORAL at 08:21

## 2020-03-06 RX ADMIN — HYDROCODONE BITARTRATE AND ACETAMINOPHEN 2 TABLET: 5; 325 TABLET ORAL at 11:12

## 2020-03-06 RX ADMIN — CYCLOBENZAPRINE HYDROCHLORIDE 5 MG: 5 TABLET, FILM COATED ORAL at 13:07

## 2020-03-06 ASSESSMENT — ACTIVITIES OF DAILY LIVING (ADL)
ADLS_ACUITY_SCORE: 25
ADLS_ACUITY_SCORE: 26
ADLS_ACUITY_SCORE: 26
ADLS_ACUITY_SCORE: 25

## 2020-03-06 NOTE — PLAN OF CARE
End of life, lung cancer with mets  Pain control - dilaudid, norco, gabapentin, fentanyl patch  Assist x 1-2, refusing repo  PCDs on   Servin intact  Mouth sores  good appetite  Wound stable  Discharge tomorrow at 1:00pm

## 2020-03-06 NOTE — DISCHARGE SUMMARY
[facility]    Discharge Summary  Hospitalist    Date of Admission:  3/3/2020  Date of Discharge:  3/6/2020  Provider:  Deshawn Savage MD  Date of Service (when I last saw the patient): 03/06/20    Discharge Diagnoses    1.  End-of-life due to metastatic lung cancer, disseminated to bones and brain.  2.  Acute kidney injury, prerenal, resolved after IV fluid resuscitation.  3.  Chronic anemia of malignancy.  4.  Presyncopal episode, suspect related to all of the above.  5.  Mild hyponatremia, resolving.       Other medical issues:  Past Medical History:   Diagnosis Date     Hypertension      Malignant neoplasm (H)     breast cancer, double mastectomy 1991     Thyroid disease     thyroidectomy       History of Present Illness   Susan Beckham is an 69 year old female who presented with near syncopal episode.  Please see the admission history and physical for full details.    Hospital Course   69-year-old female with a history of thyroid cancer status post thyroidectomy (>30yr ago), breast cancer status post bilateral mastectomy/chemo (1991), hypertension, moderate persistent asthma, depression, and metastatic lung cancer (brain, bone) currently followed by Dr. Shah in [place] as well as Dr. Rosenthal here with [place] [facility]. Admitted 3/3/2020 for pre-syncope, hypotension, renal failure     End of life due to metastatic lung cancer    - mets to brain/bone    - was to establish care with Dr. Rosenthal, but was sent to the ED from the clinic due to pre-syncope/hypotension    - Going to [facility] for hospice care    - Palliative Care involved    - social work involvedt    - pain control: by Pain/Palliative Care     Acute renal failure    - resolved with IVF     Hyponatremia    - improved     Anemia    - chronic anemia of malignancy    # Discharge Pain Plan:    - Patient currently is being prescribed pain medications on discharge by palliative team.    Significant Results and Procedures   See below      Pending Results      Unresulted Labs Ordered in the Past 30 Days of this Admission     No orders found from 2/2/2020 to 3/4/2020.          Code Status   DNR / DNI       Primary Care Physician   Ayde Wong MD    GEN:  Alert, oriented x 3, appears comfortable, NAD.  HEENT:  Normocephalic/atraumatic, no scleral icterus, no nasal discharge, mouth moist.  CV:  Regular rate and rhythm, no murmur or JVD.  S1 + S2 noted, no S3 or S4.  LUNGS:  Clear to auscultation bilaterally without rales/rhonchi/wheezing/retractions.  Symmetric chest rise on inhalation noted.  ABD:  Active bowel sounds, soft, non-tender/non-distended.  No rebound/guarding/rigidity.  EXT:  No edema or cyanosis.  No joint synovitis noted.  SKIN:  Dry to touch, no exanthems noted in the visualized areas.     Discharge Disposition   Discharged to home with hospice    Consultations This Hospital Stay   PALLIATIVE CARE ADULT IP CONSULT  SOCIAL WORK IP CONSULT  WOUND OSTOMY CONTINENCE NURSE  IP CONSULT  SOCIAL WORK IP CONSULT  SPIRITUAL HEALTH SERVICES IP CONSULT    Time Spent on this Encounter   I, Deshawn Savage MD, personally saw the patient today and spent greater than 30 minutes discharging this patient.     Discharge Orders   No discharge procedures on file.  Discharge Medications   Current Discharge Medication List      START taking these medications    Details   acetaminophen (TYLENOL) 325 MG tablet Take 1-2 tablets (325-650 mg) by mouth every 4 hours as needed for mild pain or fever  Qty: 100 tablet, Refills: 1    Associated Diagnoses: Primary malignant neoplasm of lung metastatic to other site, unspecified laterality (H)      acetaminophen (TYLENOL) 650 MG suppository Place 1 suppository (650 mg) rectally every 4 hours as needed for fever or mild pain (Do not exceed 4000 mg total acetaminophen per day.) Unwrap prior to insertion.  Qty: 12 suppository, Refills: 1    Associated Diagnoses: Primary malignant neoplasm of lung metastatic to other  site, unspecified laterality (H)      atropine 1 % ophthalmic solution Take 2-4 drops by mouth, place under tongue or place inside cheek every 2 hours as needed for other (terminal respiratory secretions) Not for ophthalmic use.  Qty: 5 mL, Refills: 1    Associated Diagnoses: Primary malignant neoplasm of lung metastatic to other site, unspecified laterality (H)      bisacodyl (DULCOLAX) 10 MG suppository Unwrap and insert 1 suppository rectally twice daily as needed for constipation.  Qty: 12 suppository, Refills: 1    Associated Diagnoses: Primary malignant neoplasm of lung metastatic to other site, unspecified laterality (H)      DURAGESIC 25 MCG/HR 72 hr patch Place 1 patch onto the skin every 72 hours remove old patch. Patient dismissing with [facility]  Qty: 1 patch, Refills: 0    Associated Diagnoses: Primary malignant neoplasm of lung metastatic to other site, unspecified laterality (H)      haloperidol (HALDOL) 0.5 MG tablet Take 2 tablets (1 mg) by mouth, place under tongue or insert rectally every 6 hours as needed for agitation (nausea)  Qty: 30 tablet, Refills: 1    Associated Diagnoses: Primary malignant neoplasm of lung metastatic to other site, unspecified laterality (H)      HYDROmorphone (DILAUDID) 2 MG tablet Take 1 tablet (2 mg) by mouth or place under tongue every 2 hours as needed for moderate to severe pain (and/or shortness of breath.)  Qty: 30 tablet, Refills: 0    Associated Diagnoses: Primary malignant neoplasm of lung metastatic to other site, unspecified laterality (H)      LORazepam (ATIVAN) 0.5 MG tablet Take 1 tablet (0.5 mg) by mouth, place under tongue or insert rectally every 4 hours as needed for agitation, anxiety, muscle spasms, nausea, seizures, sleep or vomiting (restlessness)  Qty: 30 tablet, Refills: 1    Associated Diagnoses: Primary malignant neoplasm of lung metastatic to other site, unspecified laterality (H)      MEDICATION INSTRUCTION If care facility cannot accept  or use ranges, facility is instructed to use lower end of dosing range    Associated Diagnoses: Primary malignant neoplasm of lung metastatic to other site, unspecified laterality (H)      sennosides (SENOKOT) 8.6 MG tablet Take 1-2 tablets by mouth 2 times daily  Qty: 100 tablet, Refills: 1    Associated Diagnoses: Primary malignant neoplasm of lung metastatic to other site, unspecified laterality (H)         CONTINUE these medications which have CHANGED    Details   gabapentin (NEURONTIN) 300 MG capsule Take 1 capsule (300 mg) by mouth 3 times daily  Qty: 90 capsule, Refills: 0    Associated Diagnoses: Primary malignant neoplasm of lung metastatic to other site, unspecified laterality (H)      OLANZapine (ZYPREXA) 2.5 MG tablet Take 1 tablet (2.5 mg) by mouth At Bedtime Or as directed on chemo days  Qty: 15 tablet, Refills: 0    Associated Diagnoses: Primary malignant neoplasm of lung metastatic to other site, unspecified laterality (H)         CONTINUE these medications which have NOT CHANGED    Details   albuterol (PROAIR HFA/PROVENTIL HFA/VENTOLIN HFA) 108 (90 Base) MCG/ACT inhaler Inhale 2 puffs into the lungs every 6 hours as needed for shortness of breath / dyspnea or wheezing    Comments: Pharmacy may dispense brand covered by insurance (Proair, or proventil or ventolin or generic albuterol inhaler)      dexamethasone (DECADRON) 4 MG tablet Take 4 mg by mouth 2 times daily (with meals)      mometasone-formoterol (DULERA) 200-5 MCG/ACT inhaler Inhale 2 puffs into the lungs 2 times daily      prochlorperazine (COMPAZINE) 10 MG tablet Take 10 mg by mouth every 6 hours as needed for nausea or vomiting         STOP taking these medications       cyclobenzaprine (FLEXERIL) 5 MG tablet Comments:   Reason for Stopping:         HYDROcodone-acetaminophen (NORCO) 5-325 MG tablet Comments:   Reason for Stopping:         morphine (MS CONTIN) 15 MG CR tablet Comments:   Reason for Stopping:             Allergies    Allergies   Allergen Reactions     Sulfa Drugs Hives     Data   Most Recent 3 CBC's:  Recent Labs   Lab Test 03/04/20  0517 03/03/20  1455   WBC 1.5* 3.0*   HGB 8.1* 8.8*   MCV 91 91    176      Most Recent 3 BMP's:  Recent Labs   Lab Test 03/04/20  0517 03/03/20  1455   * 128*   POTASSIUM 3.4 4.2   CHLORIDE 103 94   CO2 23 24   BUN 35* 60*   CR 0.71 2.44*   ANIONGAP 6 10   DAYAMI 8.5 9.2   * 135*     Most Recent 2 LFT's:  Recent Labs   Lab Test 03/03/20  1455   AST 40   ALT 29   ALKPHOS 49   BILITOTAL 1.0     Most Recent INR's and Anticoagulation Dosing History:  Anticoagulation Dose History     There is no flowsheet data to display.        Most Recent 3 Troponin's:No lab results found.  Most Recent Cholesterol Panel:No lab results found.  Most Recent 6 Bacteria Isolates From Any Culture (See EPIC Reports for Culture Details):No lab results found.  Most Recent TSH, T4 and A1c Labs:No lab results found.  No results found for this or any previous visit.      Disclaimer: This note consists of symbols derived from keyboarding, dictation and/or voice recognition software. As a result, there may be errors in the script that have gone undetected. Please consider this when interpreting information found in this chart.

## 2020-03-06 NOTE — PLAN OF CARE
Pt to D/C to [facility].  Pt provided with hospice/new medications. Wound cares completed per POC. Servin left in place. IV Dc'd after medication given for transport comfort. Pt verbalized understanding of transport plan.  All questions were answered at this time. HE to provide transport.  All personal belongings sent with pt. Pt's daughter instructed to bring pt's home meds (Dexamethasone) and new thrush meds, (magic mouthwash and Nystatin), that were not new discharge meds to the hospice center.